# Patient Record
Sex: FEMALE | Race: BLACK OR AFRICAN AMERICAN | Employment: UNEMPLOYED | RURAL
[De-identification: names, ages, dates, MRNs, and addresses within clinical notes are randomized per-mention and may not be internally consistent; named-entity substitution may affect disease eponyms.]

---

## 2017-01-25 ENCOUNTER — OFFICE VISIT (OUTPATIENT)
Dept: INTERNAL MEDICINE CLINIC | Age: 52
End: 2017-01-25

## 2017-01-25 VITALS
BODY MASS INDEX: 39.1 KG/M2 | SYSTOLIC BLOOD PRESSURE: 133 MMHG | RESPIRATION RATE: 20 BRPM | HEART RATE: 82 BPM | OXYGEN SATURATION: 100 % | WEIGHT: 264 LBS | TEMPERATURE: 95 F | DIASTOLIC BLOOD PRESSURE: 87 MMHG | HEIGHT: 69 IN

## 2017-01-25 DIAGNOSIS — J01.00 ACUTE MAXILLARY SINUSITIS, RECURRENCE NOT SPECIFIED: Primary | ICD-10-CM

## 2017-01-25 DIAGNOSIS — K21.9 GASTROESOPHAGEAL REFLUX DISEASE WITHOUT ESOPHAGITIS: ICD-10-CM

## 2017-01-25 DIAGNOSIS — J02.9 SORE THROAT: ICD-10-CM

## 2017-01-25 DIAGNOSIS — E66.9 OBESITY (BMI 30-39.9): ICD-10-CM

## 2017-01-25 LAB
S PYO AG THROAT QL: NEGATIVE
VALID INTERNAL CONTROL?: YES

## 2017-01-25 RX ORDER — AMOXICILLIN AND CLAVULANATE POTASSIUM 500; 125 MG/1; MG/1
1 TABLET, FILM COATED ORAL 2 TIMES DAILY
Qty: 20 TAB | Refills: 0 | Status: SHIPPED | OUTPATIENT
Start: 2017-01-25 | End: 2017-02-23 | Stop reason: ALTCHOICE

## 2017-01-25 RX ORDER — ESOMEPRAZOLE MAGNESIUM 20 MG/1
1 TABLET, DELAYED RELEASE ORAL DAILY
Qty: 30 TAB | Refills: 5 | Status: SHIPPED | OUTPATIENT
Start: 2017-01-25 | End: 2017-10-03 | Stop reason: ALTCHOICE

## 2017-01-25 NOTE — PATIENT INSTRUCTIONS
Learning About Dietary Guidelines  What are the Dietary Guidelines for Americans? Dietary Guidelines for Americans provide tips for eating well and staying healthy. This helps reduce the risk for long-term (chronic) diseases. These adult guidelines from [place] recommend that you:  · Eat lots of fruits, vegetables, whole grains, and low-fat or nonfat dairy products. · Try to balance your eating with your activity. This helps you stay at a healthy weight. · Drink alcohol in moderation, if at all. · Limit foods high in salt, saturated fat, trans fat, and added sugar. What is MyPlate? MyPlate is the U.S. government's food guide. It can help you make your own well-balanced eating plan. A balanced eating plan means that you eat enough, but not too much, and that your food gives you the nutrients you need to stay healthy. MyPlate focuses on eating plenty of whole grains, fruits, and vegetables, and on limiting fat and sugar. It is available online at www. ChooseMyPlate.gov. How can you get started? MyPlate suggests that most adults eat certain amounts from the different food groups:  Grains  Eat 5 to 8 ounces of grains each day. Half of those should be whole grains. Choose whole-grain breads, cold and cooked cereals and grains, pasta (without creamy sauces), hard rolls, or low-fat or fat-free crackers. Vegetables  Eat 2 to 3 cups of vegetables every day. They contain little if any fat. And they have lots of nutrients that help protect against heart disease. Fruits  Eat 1½ to 2 cups of fruits every day. Fruits contain very little fat but lots of nutrients. Protein foods  Most adults need 5 to 6½ ounces each day. Choose fish and lean poultry more often. Eat red meat and fried meats less often. Dried beans, tofu, and nuts are also good sources of protein. Dairy  Most adults need 3 cups of milk and milk products a day. Choose low-fat or fat-free products from this food group.  If you have problems digesting milk, try eating cheese or yogurt instead. Limit fats and oils, including those used in cooking. When you do use fats, choose oils that are liquid at room temperature (unsaturated fats). These include canola oil and olive oil. Avoid foods with trans fats, such as many fried foods, cookies, and snack foods. Where can you learn more? Go to http://melly-chika.info/. Enter R238 in the search box to learn more about \"Learning About Dietary Guidelines. \"  Current as of: July 26, 2016  Content Version: 11.1  © 9573-5896 [employer]. Care instructions adapted under license by [employer] (which disclaims liability or warranty for this information). If you have questions about a medical condition or this instruction, always ask your healthcare professional. [address] any warranty or liability for your use of this information.

## 2017-01-25 NOTE — MR AVS SNAPSHOT
Visit Information Date & Time Provider Department Dept. Phone Encounter #  
 1/25/2017 10:00 AM Meghan Beltran NP [facility] Care [phone] Upcoming Health Maintenance Date Due HEMOGLOBIN A1C Q6M 1/26/2017* EYE EXAM RETINAL OR DILATED Q1 4/30/2017* FOOT EXAM Q1 12/2/2017 MICROALBUMIN Q1 12/2/2017 LIPID PANEL Q1 12/2/2017 PAP AKA CERVICAL CYTOLOGY 7/28/2018 DTaP/Tdap/Td series (2 - Td) 1/18/2026 *Topic was postponed. The date shown is not the original due date. Allergies as of 1/25/2017  Review Complete On: 1/25/2017 By: Meghan Beltran NP Severity Noted Reaction Type Reactions Cymbalta [Duloxetine]  05/10/2012    Other (comments)  
 dizziness Zithromax [Azithromycin]  11/17/2014    Other (comments) Current Immunizations  Reviewed on 1/9/2017 Name Date HEP A/HEP B Combined Vaccine 1/21/2011 Influenza Vaccine 10/30/2016, 10/15/2012 Influenza Vaccine Intradermal PF 11/18/2014 Influenza Vaccine Split 9/22/2011, 1/14/2011 PPD 10/12/2012, 1/21/2011 Pneumococcal Conjugate (PCV-13) 11/12/2015 Pneumococcal Polysaccharide (PPSV-23) 1/31/2011 Pneumococcal Vaccine (Unspecified Type) 1/14/2011 Tdap 1/18/2016 Not reviewed this visit You Were Diagnosed With   
  
 Codes Comments Acute maxillary sinusitis, recurrence not specified    -  Primary ICD-10-CM: J01.00 ICD-9-CM: 461.0 Sore throat     ICD-10-CM: J02.9 ICD-9-CM: 653 Gastroesophageal reflux disease without esophagitis     ICD-10-CM: K21.9 ICD-9-CM: 530.81 Vitals BP Pulse Temp Resp Height(growth percentile) Weight(growth percentile) 133/87 (BP 1 Location: Left arm, BP Patient Position: Sitting) 82 95 °F (35 °C) (Oral) 20 5' 9\" (1.753 m) 264 lb (119.7 kg) LMP SpO2 BMI OB Status Smoking Status 05/01/2012 100% 38.99 kg/m2 Hysterectomy Never Smoker Vitals History BMI and BSA Data  Body Mass Index Body Surface Area  
 38.99 kg/m 2 2.41 m 2 Preferred Pharmacy Pharmacy Name Phone [facility] [address] [facility], [address] [phone] Your Updated Medication List  
  
   
This list is accurate as of: 1/25/17 11:17 AM.  Always use your most recent med list.  
  
  
  
  
 albuterol 90 mcg/actuation inhaler Commonly known as:  PROVENTIL HFA, VENTOLIN HFA, PROAIR HFA Take 2 Puffs by inhalation every four (4) hours as needed for Wheezing. amLODIPine-benazepril 10-20 mg per capsule Commonly known as:  Giuseppe Ban Take 1 Cap by mouth daily. amoxicillin-clavulanate 500-125 mg per tablet Commonly known as:  AUGMENTIN Take 1 Tab by mouth two (2) times a day. carvedilol 12.5 mg tablet Commonly known as:  Perdue Servant Take 1 Tab by mouth two (2) times daily (with meals). esomeprazole magnesium 20 mg Tbec Commonly known as:  NexIUM 24HR Take 1 Tab by mouth daily. GENVOYA Tab tablet Generic drug:  elvitegravir-cobicistat-emtricitabine-tenofovir alafenamide  
  
 hydroCHLOROthiazide 25 mg tablet Commonly known as:  HYDRODIURIL Take 1 Tab by mouth daily. hydrocortisone 2.5 % topical cream  
Commonly known as:  HYTONE Apply  to affected area two (2) times a day. use thin layer Prescriptions Sent to Pharmacy Refills  
 amoxicillin-clavulanate (AUGMENTIN) 500-125 mg per tablet 0 Sig: Take 1 Tab by mouth two (2) times a day. Class: Normal  
 Pharmacy: [address] [facility], [address] [facility] Ph #: [phone] Route: Oral  
 esomeprazole magnesium (NEXIUM 24HR) 20 mg TbEC 5 Sig: Take 1 Tab by mouth daily. Class: Normal  
 Pharmacy: [id number] [facility], [address] [facility] Ph #: [phone] Route: Oral  
  
We Performed the Following AMB POC RAPID STREP A [99484 CPT(R)] Patient Instructions Learning About Dietary Guidelines What are the Dietary Guidelines for Americans? Dietary Guidelines for Americans provide tips for eating well and staying healthy. This helps reduce the risk for long-term (chronic) diseases. These adult guidelines from the [place] recommend that you: 
· Eat lots of fruits, vegetables, whole grains, and low-fat or nonfat dairy products. · Try to balance your eating with your activity. This helps you stay at a healthy weight. · Drink alcohol in moderation, if at all. · Limit foods high in salt, saturated fat, trans fat, and added sugar. What is MyPlate? MyPlate is the U.S[place] government's food guide. It can help you make your own well-balanced eating plan. A balanced eating plan means that you eat enough, but not too much, and that your food gives you the nutrients you need to stay healthy. MyPlate focuses on eating plenty of whole grains, fruits, and vegetables, and on limiting fat and sugar. It is available online at www. ChooseMyPlate.gov. How can you get started? MyPlate suggests that most adults eat certain amounts from the different food groups: 
Grains Eat 5 to 8 ounces of grains each day. Half of those should be whole grains. Choose whole-grain breads, cold and cooked cereals and grains, pasta (without creamy sauces), hard rolls, or low-fat or fat-free crackers. Vegetables Eat 2 to 3 cups of vegetables every day. They contain little if any fat. And they have lots of nutrients that help protect against heart disease. Fruits Eat 1½ to 2 cups of fruits every day. Fruits contain very little fat but lots of nutrients. Protein foods Most adults need 5 to 6½ ounces each day. Choose fish and lean poultry more often. Eat red meat and fried meats less often. Dried beans, tofu, and nuts are also good sources of protein. Dairy Most adults need 3 cups of milk and milk products a day. Choose low-fat or fat-free products from this food group.  If you have problems digesting milk, try eating cheese or yogurt instead. Limit fats and oils, including those used in cooking. When you do use fats, choose oils that are liquid at room temperature (unsaturated fats). These include canola oil and olive oil. Avoid foods with trans fats, such as many fried foods, cookies, and snack foods. Where can you learn more? Go to http://melly-chika.info/. Enter B515 in the search box to learn more about \"Learning About Dietary Guidelines. \" Current as of: July 26, 2016 Content Version: 11.1 © 9486-3034 [employer]. Care instructions adapted under license by [employer] (which disclaims liability or warranty for this information). If you have questions about a medical condition or this instruction, always ask your healthcare professional. [address] any warranty or liability for your use of this information. Introducing [facility]! Dear Chrystal Wells: 
Thank you for requesting a Cloupia account. Our records indicate that you already have an active Cloupia account. You can access your account anytime at [email or web address] Did you know that you can access your hospital and ER discharge instructions at any time in Cloupia? You can also review all of your test results from your hospital stay or ER visit. Additional Information If you have questions, please visit the Frequently Asked Questions section of the Cloupia website at [email or web address] Remember, Cloupia is NOT to be used for urgent needs. For medical emergencies, dial 911. Now available from your iPhone and Android! Please provide this summary of care documentation to your next provider. Your primary care clinician is listed as Ike Mcduffie. If you have any questions after today's visit, please call [phone].

## 2017-01-25 NOTE — PROGRESS NOTES
HISTORY OF PRESENT ILLNESS  Sumit Ryan is a 54 y.o. female. HPI  Chief Complaint   Patient presents with    Cough     productive of yellow to green sputum, blood tinged    Sinus Infection     Request refill for nexium. Review of Systems   Constitutional: Positive for chills. Negative for fever. HENT: Positive for congestion and sore throat. Negative for ear discharge and ear pain. Respiratory: Positive for cough and sputum production. Cardiovascular: Positive for chest pain and leg swelling. Feels it is stress related chest pain  Did not want it investigated  Swelling goes down over night. Neurological: Positive for headaches. Physical Exam   Constitutional: She is oriented to person, place, and time. She appears well-developed and well-nourished. HENT:   Head: Normocephalic. Positive for pain with palpation of sinuses frontal and maxillary. Positive for postnasal drip. Eyes: Conjunctivae are normal.   Cardiovascular: Normal rate, regular rhythm, normal heart sounds and intact distal pulses. Exam reveals no gallop and no friction rub. No murmur heard. Pulmonary/Chest: Effort normal and breath sounds normal. No respiratory distress. She has no wheezes. She has no rales. Musculoskeletal: Normal range of motion. Neurological: She is alert and oriented to person, place, and time. Skin: Skin is warm and dry. Psychiatric: She has a normal mood and affect. Her behavior is normal.   Nursing note and vitals reviewed. Plan of care and AVS reviewed with patient who verbalized understanding. ASSESSMENT and PLAN    ICD-10-CM ICD-9-CM    1. Acute maxillary sinusitis, recurrence not specified J01.00 461.0 amoxicillin-clavulanate (AUGMENTIN) 500-125 mg per tablet   2. Sore throat J02.9 462 AMB POC RAPID STREP A   3. Gastroesophageal reflux disease without esophagitis K21.9 530.81 esomeprazole magnesium (NEXIUM 24HR) 20 mg TbEC   Rapid strep negative.   Reviewed use of augmentin. F/u prn. Discussed the patient's BMI with her. The BMI follow up plan is as follows:     I have reviewed/discussed the above normal BMI with the patient. I have recommended the following interventions: dietary management education, guidance, and counseling . The plan is as follows: I have counseled this patient on diet and exercise regimens.

## 2017-01-31 ENCOUNTER — TELEPHONE (OUTPATIENT)
Dept: INTERNAL MEDICINE CLINIC | Age: 52
End: 2017-01-31

## 2017-01-31 NOTE — TELEPHONE ENCOUNTER
1/31/17 @ 9:16 Called  attempted PA on Nexium 20 mg Daily state pharmacy was running capsule if run as OTC it is covered under plan. [address] called @ [phone] spoke with pharmacist Raz Lr state I will try later,request given to call patient when ready for .

## 2017-02-08 ENCOUNTER — OFFICE VISIT (OUTPATIENT)
Dept: INTERNAL MEDICINE CLINIC | Age: 52
End: 2017-02-08

## 2017-02-08 VITALS
TEMPERATURE: 97 F | RESPIRATION RATE: 20 BRPM | DIASTOLIC BLOOD PRESSURE: 84 MMHG | BODY MASS INDEX: 39.99 KG/M2 | WEIGHT: 270 LBS | HEART RATE: 79 BPM | HEIGHT: 69 IN | SYSTOLIC BLOOD PRESSURE: 135 MMHG

## 2017-02-08 DIAGNOSIS — H93.12 RINGING IN EAR, LEFT: ICD-10-CM

## 2017-02-08 DIAGNOSIS — E66.9 OBESITY (BMI 30-39.9): ICD-10-CM

## 2017-02-08 DIAGNOSIS — H61.22 IMPACTED CERUMEN OF LEFT EAR: Primary | ICD-10-CM

## 2017-02-08 NOTE — PROGRESS NOTES
Used wax removal system at home since last visit - non productive  Yosef Herrera LPN  1/7/2235  42:17 AM

## 2017-02-08 NOTE — MR AVS SNAPSHOT
Visit Information Date & Time Provider Department Dept. Phone Encounter #  
 2/8/2017 10:15 AM Nakitawendi Salguero, 1 [facility] [id number] Upcoming Health Maintenance Date Due HEMOGLOBIN A1C Q6M 4/30/2017* EYE EXAM RETINAL OR DILATED Q1 4/30/2017* FOOT EXAM Q1 12/2/2017 MICROALBUMIN Q1 12/2/2017 LIPID PANEL Q1 12/2/2017 PAP AKA CERVICAL CYTOLOGY 7/28/2018 DTaP/Tdap/Td series (2 - Td) 1/18/2026 *Topic was postponed. The date shown is not the original due date. Allergies as of 2/8/2017  Review Complete On: 2/8/2017 By: Nakita Salguero NP Severity Noted Reaction Type Reactions Cymbalta [Duloxetine]  05/10/2012    Other (comments)  
 dizziness Zithromax [Azithromycin]  11/17/2014    Other (comments) Current Immunizations  Reviewed on 1/9/2017 Name Date HEP A/HEP B Combined Vaccine 1/21/2011 Influenza Vaccine 10/30/2016, 10/15/2012 Influenza Vaccine Intradermal PF 11/18/2014 Influenza Vaccine Split 9/22/2011, 1/14/2011 PPD 10/12/2012, 1/21/2011 Pneumococcal Conjugate (PCV-13) 11/12/2015 Pneumococcal Polysaccharide (PPSV-23) 1/31/2011 Pneumococcal Vaccine (Unspecified Type) 1/14/2011 Tdap 1/18/2016 Not reviewed this visit You Were Diagnosed With   
  
 Codes Comments Impacted cerumen of left ear    -  Primary ICD-10-CM: H61.22 
ICD-9-CM: 380.4 Vitals BP Pulse Temp Resp Height(growth percentile) Weight(growth percentile) 135/84 (BP 1 Location: Left arm, BP Patient Position: Sitting) 79 97 °F (36.1 °C) (Oral) 20 5' 9\" (1.753 m) 270 lb (122.5 kg) LMP BMI OB Status Smoking Status 05/01/2012 39.87 kg/m2 Hysterectomy Never Smoker BMI and BSA Data Body Mass Index Body Surface Area  
 39.87 kg/m 2 2.44 m 2 Preferred Pharmacy Pharmacy Name Phone [facility] [address] [facility], [address] [facility] [phone] Your Updated Medication List  
  
   
This list is accurate as of: 2/8/17 11:07 AM.  Always use your most recent med list.  
  
  
  
  
 albuterol 90 mcg/actuation inhaler Commonly known as:  PROVENTIL HFA, VENTOLIN HFA, PROAIR HFA Take 2 Puffs by inhalation every four (4) hours as needed for Wheezing. amLODIPine-benazepril 10-20 mg per capsule Commonly known as:  Leafy Beauregard Take 1 Cap by mouth daily. amoxicillin-clavulanate 500-125 mg per tablet Commonly known as:  AUGMENTIN Take 1 Tab by mouth two (2) times a day. carvedilol 12.5 mg tablet Commonly known as:  Ca Naty Take 1 Tab by mouth two (2) times daily (with meals). esomeprazole magnesium 20 mg Tbec Commonly known as:  NexIUM 24HR Take 1 Tab by mouth daily. GENVOYA Tab tablet Generic drug:  elvitegravir-cobicistat-emtricitabine-tenofovir alafenamide  
  
 hydroCHLOROthiazide 25 mg tablet Commonly known as:  HYDRODIURIL Take 1 Tab by mouth daily. hydrocortisone 2.5 % topical cream  
Commonly known as:  HYTONE Apply  to affected area two (2) times a day. use thin layer We Performed the Following REMOVE IMPACTED EAR WAX [33786 CPT(R)] Introducing [facility]! Dear Nallely Aragon: 
Thank you for requesting a Acarix account. Our records indicate that you already have an active Acarix account. You can access your account anytime at [email or web address] Did you know that you can access your hospital and ER discharge instructions at any time in Acarix? You can also review all of your test results from your hospital stay or ER visit. Additional Information If you have questions, please visit the Frequently Asked Questions section of the Acarix website at [email or web address] Remember, Acarix is NOT to be used for urgent needs. For medical emergencies, dial 911. Now available from your iPhone and Android!  
  
  
 Please provide this summary of care documentation to your next provider. Your primary care clinician is listed as Yudi Maher. If you have any questions after today's visit, please call [phone].

## 2017-02-16 NOTE — PROGRESS NOTES
HISTORY OF PRESENT ILLNESS  Ashley Culp is a 54 y.o. female. HPI  Chief Complaint   Patient presents with    Ear Fullness     left ear    Ringing in Ear     left ear     States used ear wax removal but ear did not get better. Requesting ear irrigation. Review of Systems   Constitutional: Negative. HENT: Positive for tinnitus. Negative for ear discharge and ear pain. C/O ear fullness   Eyes: Negative. Respiratory: Negative. Negative for cough. Cardiovascular: Negative. Negative for chest pain and leg swelling. Gastrointestinal: Negative. Genitourinary: Negative. Physical Exam   Constitutional: She is oriented to person, place, and time. She appears well-developed and well-nourished. No distress. HENT:   Head: Normocephalic and atraumatic. Positive for cerumen left ear. Eyes: Conjunctivae are normal.   Cardiovascular: Normal rate, regular rhythm, normal heart sounds and intact distal pulses. Exam reveals no gallop and no friction rub. No murmur heard. Pulmonary/Chest: Effort normal and breath sounds normal. No respiratory distress. She has no wheezes. She has no rales. Abdominal: Soft. Bowel sounds are normal. She exhibits no distension. There is no tenderness. Musculoskeletal: Normal range of motion. She exhibits no edema, tenderness or deformity. Neurological: She is alert and oriented to person, place, and time. Skin: Skin is warm and dry. She is not diaphoretic. Nursing note and vitals reviewed. Plan of care and AVS reviewed with patient who verbalized understanding. ASSESSMENT and PLAN    ICD-10-CM ICD-9-CM    1. Impacted cerumen of left ear H61.22 380.4 REMOVE IMPACTED EAR WAX   2. Ringing in ear, left H93.12 388.30    3. Obesity (BMI 30-39. 9) E66.9 278.00    Patient to monitor ringing in ear. F/U prn. Discussed the patient's BMI with her.   The BMI follow up plan is as follows:     I have reviewed/discussed the above normal BMI with the patient. I have recommended the following interventions: dietary management education, guidance, and counseling . The plan is as follows: I have counseled this patient on diet and exercise regimens.

## 2017-02-23 ENCOUNTER — OFFICE VISIT (OUTPATIENT)
Dept: INTERNAL MEDICINE CLINIC | Age: 52
End: 2017-02-23

## 2017-02-23 VITALS
DIASTOLIC BLOOD PRESSURE: 87 MMHG | OXYGEN SATURATION: 98 % | WEIGHT: 265.8 LBS | TEMPERATURE: 97.3 F | RESPIRATION RATE: 18 BRPM | SYSTOLIC BLOOD PRESSURE: 130 MMHG | HEART RATE: 88 BPM | BODY MASS INDEX: 39.37 KG/M2 | HEIGHT: 69 IN

## 2017-02-23 DIAGNOSIS — Z20.828 EXPOSURE TO THE FLU: ICD-10-CM

## 2017-02-23 DIAGNOSIS — J01.10 ACUTE FRONTAL SINUSITIS, RECURRENCE NOT SPECIFIED: Primary | ICD-10-CM

## 2017-02-23 LAB
QUICKVUE INFLUENZA TEST: NEGATIVE
VALID INTERNAL CONTROL?: YES

## 2017-02-23 RX ORDER — CIPROFLOXACIN 500 MG/1
TABLET ORAL
COMMUNITY
Start: 2016-12-09 | End: 2017-02-23 | Stop reason: ALTCHOICE

## 2017-02-23 RX ORDER — AMOXICILLIN AND CLAVULANATE POTASSIUM 500; 125 MG/1; MG/1
1 TABLET, FILM COATED ORAL 2 TIMES DAILY
Qty: 20 TAB | Refills: 0 | Status: SHIPPED | OUTPATIENT
Start: 2017-02-23 | End: 2017-05-02 | Stop reason: ALTCHOICE

## 2017-02-23 RX ORDER — AMOXICILLIN AND CLAVULANATE POTASSIUM 500; 125 MG/1; MG/1
TABLET, FILM COATED ORAL
COMMUNITY
Start: 2017-01-25 | End: 2017-02-23 | Stop reason: ALTCHOICE

## 2017-02-23 RX ORDER — OSELTAMIVIR PHOSPHATE 75 MG/1
75 CAPSULE ORAL DAILY
Qty: 10 CAP | Refills: 0 | Status: SHIPPED | OUTPATIENT
Start: 2017-02-23 | End: 2017-03-05

## 2017-02-23 NOTE — LETTER
NOTIFICATION RETURN TO WORK / SCHOOL 
 
2/23/2017 2:16 PM 
 
Ms. Renetta Diaz [address] [address] To Whom It May Concern: 
 
Renetta Diaz is currently under the care of [address]. She will return to work/school on: February 27, 2017. If there are questions or concerns please have the patient contact our office.  
 
 
 
Sincerely, 
 
 
Falguni Turner NP

## 2017-02-23 NOTE — MR AVS SNAPSHOT
Visit Information Date & Time Provider Department Dept. Phone Encounter #  
 2/23/2017  1:45 PM Epi Headley, 1 [facility] [phone] [id number] Your Appointments 10/27/2017 10:30 AM  
ROUTINE CARE with Lisa Pham MD  
[facility]) Appt Note: follow up 2 weeks am 10.06.2016; Well Woman $0CP SL 2.22.17  
 [address] [facility]. Subhash [address] [address] [address] [place] [address] [address] [address] Upcoming Health Maintenance Date Due HEMOGLOBIN A1C Q6M 4/30/2017* EYE EXAM RETINAL OR DILATED Q1 4/30/2017* FOOT EXAM Q1 12/2/2017 MICROALBUMIN Q1 12/2/2017 LIPID PANEL Q1 12/2/2017 PAP AKA CERVICAL CYTOLOGY 7/28/2018 DTaP/Tdap/Td series (2 - Td) 1/18/2026 *Topic was postponed. The date shown is not the original due date. Allergies as of 2/23/2017  Review Complete On: 2/23/2017 By: Epi Headley NP Severity Noted Reaction Type Reactions Cymbalta [Duloxetine]  05/10/2012    Other (comments)  
 dizziness Zithromax [Azithromycin]  11/17/2014    Other (comments) Current Immunizations  Reviewed on 1/9/2017 Name Date HEP A/HEP B Combined Vaccine 1/21/2011 Influenza Vaccine 10/30/2016, 10/15/2012 Influenza Vaccine Intradermal PF 11/18/2014 Influenza Vaccine Split 9/22/2011, 1/14/2011 PPD 10/12/2012, 1/21/2011 Pneumococcal Conjugate (PCV-13) 11/12/2015 Pneumococcal Polysaccharide (PPSV-23) 1/31/2011 Pneumococcal Vaccine (Unspecified Type) 1/14/2011 Tdap 1/18/2016 Not reviewed this visit You Were Diagnosed With   
  
 Codes Comments Acute frontal sinusitis, recurrence not specified    -  Primary ICD-10-CM: J01.10 ICD-9-CM: 947.3 Exposure to the flu     ICD-10-CM: Z20.828 ICD-9-CM: V01.79 Vitals BP  
  
  
  
  
  
 130/87 (BP 1 Location: Left arm, BP Patient Position: Sitting)  Vitals History BMI and BSA Data Body Mass Index Body Surface Area  
 39.25 kg/m 2 2.42 m 2 Preferred Pharmacy Pharmacy Name Phone Yancy Jiménez [facility] [phone] Your Updated Medication List  
  
   
This list is accurate as of: 2/23/17  2:12 PM.  Always use your most recent med list.  
  
  
  
  
 albuterol 90 mcg/actuation inhaler Commonly known as:  PROVENTIL HFA, VENTOLIN HFA, PROAIR HFA Take 2 Puffs by inhalation every four (4) hours as needed for Wheezing. amLODIPine-benazepril 10-20 mg per capsule Commonly known as:  Pamela Heimlich Take 1 Cap by mouth daily. amoxicillin-clavulanate 500-125 mg per tablet Commonly known as:  AUGMENTIN Take 1 Tab by mouth two (2) times a day. carvedilol 12.5 mg tablet Commonly known as:  Emeterio Lawman Take 1 Tab by mouth two (2) times daily (with meals). esomeprazole magnesium 20 mg Tbec Commonly known as:  NexIUM 24HR Take 1 Tab by mouth daily. GENVOYA Tab tablet Generic drug:  elvitegravir-cobicistat-emtricitabine-tenofovir alafenamide  
  
 hydroCHLOROthiazide 25 mg tablet Commonly known as:  HYDRODIURIL Take 1 Tab by mouth daily. hydrocortisone 2.5 % topical cream  
Commonly known as:  HYTONE Apply  to affected area two (2) times a day. use thin layer  
  
 oseltamivir 75 mg capsule Commonly known as:  TAMIFLU Take 1 Cap by mouth daily for 10 days. Prescriptions Sent to Pharmacy Refills  
 amoxicillin-clavulanate (AUGMENTIN) 500-125 mg per tablet 0 Sig: Take 1 Tab by mouth two (2) times a day. Class: Normal  
 Pharmacy: [address] Ph #: [phone] Route: Oral  
 oseltamivir (TAMIFLU) 75 mg capsule 0 Sig: Take 1 Cap by mouth daily for 10 days.   
 Class: Normal  
 Pharmacy: [address]  #: [phone] Route: Oral  
  
Introducing [facility] & HEALTH SERVICES! Dear Clare Brewer: 
Thank you for requesting a InstaMed account. Our records indicate that you already have an active InstaMed account. You can access your account anytime at [email or web address]. [email or web address] Did you know that you can access your hospital and ER discharge instructions at any time in InstaMed? You can also review all of your test results from your hospital stay or ER visit. Additional Information If you have questions, please visit the Frequently Asked Questions section of the InstaMed website at [email or web address] Remember, InstaMed is NOT to be used for urgent needs. For medical emergencies, dial 911. Now available from your iPhone and Android! Please provide this summary of care documentation to your next provider. Your primary care clinician is listed as Floyd Quiver. If you have any questions after today's visit, please call [phone].

## 2017-02-23 NOTE — PROGRESS NOTES
HISTORY OF PRESENT ILLNESS  Alesia Martini is a 54 y.o. female. HPI  Chief Complaint   Patient presents with    Nasal Congestion     Body Aches, Malaise, Fever, Been Exposed to Flu      C/O sore throat and cough and exposed to flu    Review of Systems   Constitutional: Positive for fever. Negative for chills. HENT: Positive for congestion and sore throat. Respiratory: Positive for cough. Negative for shortness of breath and wheezing. Gastrointestinal: Positive for nausea. Musculoskeletal: Positive for myalgias. Neurological: Positive for headaches. Physical Exam   Constitutional: She is oriented to person, place, and time. She appears well-developed and well-nourished. HENT:   Head: Normocephalic and atraumatic. Positive for post nasal drip  Positive for frontal sinus pain with palpation   Eyes: Conjunctivae are normal. Right eye exhibits no discharge. Left eye exhibits no discharge. No scleral icterus. Neck: Normal range of motion. Neck supple. Cardiovascular: Normal rate, regular rhythm and normal heart sounds. Exam reveals no gallop and no friction rub. No murmur heard. Pulmonary/Chest: Effort normal. No respiratory distress. She has no wheezes. She has no rales. Positive for intermittent cough   Musculoskeletal: Normal range of motion. She exhibits no edema, tenderness or deformity. Neurological: She is alert and oriented to person, place, and time. Skin: Skin is warm and dry. Nursing note and vitals reviewed. Plan of care and AVS reviewed with patient who verbalized understanding. ASSESSMENT and PLAN    ICD-10-CM ICD-9-CM    1. Acute frontal sinusitis, recurrence not specified J01.10 461.1 amoxicillin-clavulanate (AUGMENTIN) 500-125 mg per tablet   2. Exposure to the flu Z20.828 V01.79 oseltamivir (TAMIFLU) 75 mg capsule      AMB POC RAPID INFLUENZA TEST   Reviewed with patient use of augmentin and tamiflu. F/U prn.

## 2017-02-23 NOTE — PROGRESS NOTES
Chief Complaint   Patient presents with    Nasal Congestion     Body Aches, Malaise, Fever, Been Exposed to Flu      1. Have you been to the ER, urgent care clinic since your last visit? Hospitalized since your last visit? No    2. Have you seen or consulted any other health care providers outside of the [facility] since your last visit? Include any pap smears or colon screening. No     There are no preventive care reminders to display for this patient.

## 2017-03-10 ENCOUNTER — TELEPHONE (OUTPATIENT)
Dept: INTERNAL MEDICINE CLINIC | Age: 52
End: 2017-03-10

## 2017-03-10 DIAGNOSIS — J01.80 OTHER ACUTE SINUSITIS: Primary | ICD-10-CM

## 2017-03-10 RX ORDER — DOXYCYCLINE 100 MG/1
100 TABLET ORAL 2 TIMES DAILY
Qty: 20 TAB | Refills: 0 | Status: SHIPPED | OUTPATIENT
Start: 2017-03-10 | End: 2017-03-20

## 2017-03-10 NOTE — TELEPHONE ENCOUNTER
Chief Complaint   Patient presents with    Request For New Medication     SINUS INFECTION     Patient is requesting a different ABT treatment. She believes that her sinus infection may have returned. She has complaints of nasal drainage, nose clogging, and facial tightness or in her words, \"as if someone had hit me in the face with a hammer\". She requests that the medication be sent to the [facility].

## 2017-05-02 ENCOUNTER — OFFICE VISIT (OUTPATIENT)
Dept: INTERNAL MEDICINE CLINIC | Age: 52
End: 2017-05-02

## 2017-05-02 VITALS
TEMPERATURE: 97 F | BODY MASS INDEX: 38.57 KG/M2 | OXYGEN SATURATION: 100 % | HEIGHT: 69 IN | RESPIRATION RATE: 18 BRPM | DIASTOLIC BLOOD PRESSURE: 77 MMHG | WEIGHT: 260.4 LBS | HEART RATE: 75 BPM | SYSTOLIC BLOOD PRESSURE: 130 MMHG

## 2017-05-02 DIAGNOSIS — E11.9 DIABETES MELLITUS WITHOUT COMPLICATION (HCC): ICD-10-CM

## 2017-05-02 DIAGNOSIS — R10.84 GENERALIZED ABDOMINAL PAIN: Primary | ICD-10-CM

## 2017-05-02 RX ORDER — HYDROXYZINE PAMOATE 25 MG/1
CAPSULE ORAL
COMMUNITY
Start: 2017-03-18 | End: 2017-05-02 | Stop reason: ALTCHOICE

## 2017-05-02 RX ORDER — BUPROPION HYDROCHLORIDE 150 MG/1
TABLET ORAL
COMMUNITY
Start: 2017-03-18 | End: 2018-03-16 | Stop reason: ALTCHOICE

## 2017-05-02 NOTE — PROGRESS NOTES
Chief Complaint   Patient presents with    Abdominal Pain     TWICE A WEEK X 2 MONTHS; LACK OF APPETITE; DIARRHEA,     Toe Pain     RIGHT BIG TOE     1. Have you been to the ER, urgent care clinic since your last visit? Hospitalized since your last visit? No    2. Have you seen or consulted any other health care providers outside of the [address] [place] [address] since your last visit? Include any pap smears or colon screening. No     Health Maintenance Due   Topic Date Due    HEMOGLOBIN A1C Q6M  05/17/2015    EYE EXAM RETINAL OR DILATED Q1  10/17/2015     Eye exam 4 or 5 months ago [facility]). Do you have an [address] in place in the event that you have a healthcare crisis that could impact your decision making as it pertains to your health? NO    Would you like information about Advance Care Planning? NO    Information given.  NO

## 2017-05-02 NOTE — PROGRESS NOTES
HISTORY OF PRESENT ILLNESS  Delroy Painter is a 54 y.o. female. HPI  Chief Complaint   Patient presents with    Abdominal Pain     TWICE A WEEK X 2 MONTHS; LACK OF APPETITE; DIARRHEA,     Toe Pain     RIGHT BIG TOE   States having ABD pain with diarrhea x 2 months  State nexium does not help with pain. States this happens with any meal.    States pain . in in R big toe  Request paperwork showing infection in R great toe  Will F//U with Dr. Jazzy Ochoa this Friday. Review of Systems   Gastrointestinal: Positive for abdominal pain and diarrhea. Negative for blood in stool, nausea and vomiting. Skin:        Break out in sweat with stomach pain. Did not check BP with this problelm. Physical Exam   Constitutional: She is oriented to person, place, and time. She appears well-developed and well-nourished. No distress. HENT:   Head: Normocephalic and atraumatic. Eyes: Conjunctivae are normal.   Cardiovascular: Normal rate, regular rhythm, normal heart sounds and intact distal pulses. Exam reveals no gallop and no friction rub. No murmur heard. Pulmonary/Chest: Effort normal and breath sounds normal. No respiratory distress. She has no wheezes. She has no rales. Abdominal: Soft. Bowel sounds are normal. There is tenderness. Positive for generalized ABD Pain with palpation. Musculoskeletal: Normal range of motion. Neurological: She is alert and oriented to person, place, and time. Skin: Skin is warm and dry. She is not diaphoretic. Nursing note and vitals reviewed. Plan of care and AVS reviewed with patient who verbalized understanding. ASSESSMENT and PLAN    ICD-10-CM ICD-9-CM    1. Generalized abdominal pain R10.84 789.07 US ABD COMP      LIPASE      AMYLASE      CANCELED: LIPASE      CANCELED: AMYLASE   2.  Diabetes mellitus without complication ([facility] [address] X23.7 250.00 HEMOGLOBIN A1C WITH EAG      CANCELED: HEMOGLOBIN A1C WITH EAG   Resent order for eye exam from [facility]  Nursing to assist with [address]  Will notify patient of lab results. F/U 6 months DM.

## 2017-05-02 NOTE — PATIENT INSTRUCTIONS
Learning About Tests When You Have Diabetes  Why do you need regular diabetes tests? Diabetes can be hard on your body if it's not well controlled. But having tests on a regular schedule can help you and your doctor find problems early, when it's easier to start managing them. What tests do you need? The tests you may have, how often you should have them, and the goals of the tests are:  A1c blood test. This test shows the average level of blood sugar over the past 2 to 3 months. It helps your doctor see whether blood sugar levels have been staying within your target range. · How often: Every 3 to 6 months  · Goal: A blood sugar level in your target range  Blood pressure test: This test measures the pressure of blood flow in the arteries. Controlling blood pressure can help prevent damage to nerves and blood vessels. · How often: Every 3 to 6 months  · Goal: A blood pressure level in your target range  Cholesterol test: This test measures the amount of a type of fat in the blood. It is common for people with diabetes to also have high cholesterol. Too much cholesterol in the blood can build up inside the blood vessels and raise the risk for heart attack and stroke. · How often: At the time of your diabetes diagnosis, and as often as your doctor recommends after that  · Goal: A cholesterol level in your target range  Albumin-creatinine ratio test: This test checks for kidney damage by looking for the protein albumin (say \"al-BYOO-carlos a\") in the urine. Albumin is normally found in the blood. Kidney damage can let small amounts of it (microalbumin) leak into the urine. · How often: Once a year  · Goal: No protein in the urine  Blood creatinine test/estimated glomerular filtration (eGFR): The blood creatinine (say \"xklx-ZS-mi-neen\") level shows how well your kidneys are working. Creatinine is a waste product that muscles release into the blood.  Blood creatinine is used to estimate the glomerular filtration rate. A high level of creatinine and/or a low eGFR may mean your kidneys are not working as well as they should. · How often: Once a year  · Goal: Normal level of creatinine in the blood. The eGFR goal is greater than 60 mL/min/1.73 m². Complete foot exam: The doctor checks for foot sores and whether any sensation has been lost.  · How often: Once a year  · Goal: Healthy feet with no foot ulcers or loss of feeling  Dental exam and cleaning: The dentist checks for gum disease and tooth decay. People with high blood sugar are more likely to have these problems. · How often: Every 6 months  · Goal: Healthy teeth and gums  Complete eye exam: High blood sugar levels can damage the eyes. This exam is done by an ophthalmologist or optometrist. It includes a dilated eye exam. The exam shows whether there's damage to the back of the eye (diabetic retinopathy). · How often: Once a year. If you don't have any signs of diabetic retinopathy, your doctor may recommend an exam every 2 years. · Goal: No damage to the back of the eye  Thyroid-stimulating hormone (TSH) blood test: This test checks for thyroid disease. Too little thyroid hormone can cause some medicines (like insulin) to stay in the body longer. This can cause low blood sugar. You may be tested if you have high cholesterol or are a woman over 48years old. · How often: As part of your diabetes diagnosis, and as often as your doctor recommends after that  · Goal: Normal level of TSH in the blood  Follow-up care is a key part of your treatment and safety. Be sure to make and go to all appointments, and call your doctor if you are having problems. It's also a good idea to know your test results and keep a list of the medicines you take. Where can you learn more? Go to http://melly-chika.info/. Enter [id number] in the search box to learn more about \"Learning About Tests When You Have Diabetes. \"  Current as of: May 23, 2016  Content Version: 11.2  © 8675-2712 Healthwise, Incorporated. Care instructions adapted under license by [employer] (which disclaims liability or warranty for this information). If you have questions about a medical condition or this instruction, always ask your healthcare professional. [place] [address] any warranty or liability for your use of this information.

## 2017-05-02 NOTE — MR AVS SNAPSHOT
Visit Information Date & Time Provider Department Dept. Phone Encounter #  
 5/2/2017  2:00 PM Joan Rizvi NP [facility] [id number] [phone] [id number] Follow-up Instructions Return in about 6 months (around 11/2/2017) for DM. Your Appointments 10/27/2017 10:30 AM  
ROUTINE CARE with iWsam Parrish MD  
[facility] [address]) Appt Note: follow up 2 weeks am 10.06.2016; Well Woman $0CP  2.22.17  
 [address] [facility]. Subhash [address] [address] [address] [place] [address] [address] [address] Upcoming Health Maintenance Date Due HEMOGLOBIN A1C Q6M 5/17/2015 EYE EXAM RETINAL OR DILATED Q1 10/17/2015 INFLUENZA AGE 9 TO ADULT 8/1/2017 FOOT EXAM Q1 12/2/2017 MICROALBUMIN Q1 12/2/2017 LIPID PANEL Q1 12/2/2017 PAP AKA CERVICAL CYTOLOGY 7/28/2018 DTaP/Tdap/Td series (2 - Td) 1/18/2026 Allergies as of 5/2/2017  Review Complete On: 5/2/2017 By: Joan Rizvi NP Severity Noted Reaction Type Reactions Cymbalta [Duloxetine]  05/10/2012    Other (comments)  
 dizziness Zithromax [Azithromycin]  11/17/2014    Other (comments) Current Immunizations  Reviewed on 2/28/2017 Name Date HEP A/HEP B Combined Vaccine 1/21/2011 Influenza Vaccine 10/30/2016, 10/15/2012 Influenza Vaccine Intradermal PF 11/18/2014 Influenza Vaccine Split 9/22/2011, 1/14/2011 PPD 10/12/2012, 1/21/2011 Pneumococcal Conjugate (PCV-13) 11/12/2015 Pneumococcal Polysaccharide (PPSV-23) 1/31/2011 Pneumococcal Vaccine (Unspecified Type) 1/14/2011 Tdap 1/18/2016 Not reviewed this visit You Were Diagnosed With   
  
 Codes Comments Generalized abdominal pain    -  Primary ICD-10-CM: R10.84 ICD-9-CM: 789.07 [facility]     MPA-73-NV: E11.9 ICD-9-CM: 250.00 Vitals  BP Pulse Temp Resp Height(growth percentile) Weight(growth percentile) 130/77 (BP 1 Location: Right arm, BP Patient Position: Sitting) 75 97 °F (36.1 °C) (Oral) 18 5' 9\" (1.753 m) 260 lb 6.4 oz (118.1 kg) LMP SpO2 BMI OB Status Smoking Status 05/01/2012 100% 38.45 kg/m2 Hysterectomy Never Smoker Vitals History BMI and BSA Data Body Mass Index Body Surface Area  
 38.45 kg/m 2 2.4 m 2 Preferred Pharmacy Pharmacy Name Phone [facility] [address] [facility], [address] [facility] [phone] Your Updated Medication List  
  
   
This list is accurate as of: 5/2/17  2:56 PM.  Always use your most recent med list. amLODIPine-benazepril 10-20 mg per capsule Commonly known as:  Cherylin Lancaster Take 1 Cap by mouth daily. buPROPion  mg tablet Commonly known as:  WELLBUTRIN XL  
  
 esomeprazole magnesium 20 mg Tbec Commonly known as:  NexIUM 24HR Take 1 Tab by mouth daily. GENVOYA Tab tablet Generic drug:  elvitegravir-cobicistat-emtricitabine-tenofovir alafenamide  
  
 hydroCHLOROthiazide 25 mg tablet Commonly known as:  HYDRODIURIL Take 1 Tab by mouth daily. hydrocortisone 2.5 % topical cream  
Commonly known as:  HYTONE Apply  to affected area two (2) times a day. use thin layer We Performed the Following AMYLASE [id number] CPT(R)] HEMOGLOBIN A1C WITH EAG [55150 CPT(R)] LIPASE [id number] CPT(R)] Follow-up Instructions Return in about 6 months (around 11/2/2017) for DM. To-Do List   
 05/02/2017 Imaging:  US ABD COMP Patient Instructions Learning About Tests When You Have Diabetes Why do you need regular diabetes tests? Diabetes can be hard on your body if it's not well controlled. But having tests on a regular schedule can help you and your doctor find problems early, when it's easier to start managing them. What tests do you need?  
The tests you may have, how often you should have them, and the goals of the tests are: 
A1c blood test. This test shows the average level of blood sugar over the past 2 to 3 months. It helps your doctor see whether blood sugar levels have been staying within your target range. · How often: Every 3 to 6 months · Goal: A blood sugar level in your target range Blood pressure test: This test measures the pressure of blood flow in the arteries. Controlling blood pressure can help prevent damage to nerves and blood vessels. · How often: Every 3 to 6 months · Goal: A blood pressure level in your target range Cholesterol test: This test measures the amount of a type of fat in the blood. It is common for people with diabetes to also have high cholesterol. Too much cholesterol in the blood can build up inside the blood vessels and raise the risk for heart attack and stroke. · How often: At the time of your diabetes diagnosis, and as often as your doctor recommends after that · Goal: A cholesterol level in your target range Albumin-creatinine ratio test: This test checks for kidney damage by looking for the protein albumin (say \"al-BYOO-carlos a\") in the urine. Albumin is normally found in the blood. Kidney damage can let small amounts of it (microalbumin) leak into the urine. · How often: Once a year · Goal: No protein in the urine Blood creatinine test/estimated glomerular filtration (eGFR): The blood creatinine (say \"lwej-FZ-fu-neen\") level shows how well your kidneys are working. Creatinine is a waste product that muscles release into the blood. Blood creatinine is used to estimate the glomerular filtration rate. A high level of creatinine and/or a low eGFR may mean your kidneys are not working as well as they should. · How often: Once a year · Goal: Normal level of creatinine in the blood. The eGFR goal is greater than 60 mL/min/1.73 m². Complete foot exam: The doctor checks for foot sores and whether any sensation has been lost. 
· How often: Once a year · Goal: Healthy feet with no foot ulcers or loss of feeling Dental exam and cleaning: The dentist checks for gum disease and tooth decay. People with high blood sugar are more likely to have these problems. · How often: Every 6 months · Goal: Healthy teeth and gums Complete eye exam: High blood sugar levels can damage the eyes. This exam is done by an ophthalmologist or optometrist. It includes a dilated eye exam. The exam shows whether there's damage to the back of the eye (diabetic retinopathy). · How often: Once a year. If you don't have any signs of diabetic retinopathy, your doctor may recommend an exam every 2 years. · Goal: No damage to the back of the eye Thyroid-stimulating hormone (TSH) blood test: This test checks for thyroid disease. Too little thyroid hormone can cause some medicines (like insulin) to stay in the body longer. This can cause low blood sugar. You may be tested if you have high cholesterol or are a woman over 48years old. · How often: As part of your diabetes diagnosis, and as often as your doctor recommends after that · Goal: Normal level of TSH in the blood Follow-up care is a key part of your treatment and safety. Be sure to make and go to all appointments, and call your doctor if you are having problems. It's also a good idea to know your test results and keep a list of the medicines you take. Where can you learn more? Go to http://melly-chika.info/. Enter [id number] in the search box to learn more about \"Learning About Tests When You Have Diabetes. \" Current as of: May 23, 2016 Content Version: 11.2 © 3883-8703 Healthwise, Incorporated. Care instructions adapted under license by [employer] (which disclaims liability or warranty for this information). If you have questions about a medical condition or this instruction, always ask your healthcare professional. [address] any warranty or liability for your use of this information. Introducing [facility]! Dear Maral Cespedes: 
Thank you for requesting a Razient account. Our records indicate that you already have an active Razient account. You can access your account anytime at [email or web address] Did you know that you can access your hospital and ER discharge instructions at any time in Razient? You can also review all of your test results from your hospital stay or ER visit. Additional Information If you have questions, please visit the Frequently Asked Questions section of the Razient website at [email or web address] Remember, Razient is NOT to be used for urgent needs. For medical emergencies, dial 911. Now available from your iPhone and Android! Please provide this summary of care documentation to your next provider. Your primary care clinician is listed as Kierra Ponce. If you have any questions after today's visit, please call [phone].

## 2017-05-03 ENCOUNTER — TELEPHONE (OUTPATIENT)
Dept: INTERNAL MEDICINE CLINIC | Age: 52
End: 2017-05-03

## 2017-05-03 LAB
AMYLASE SERPL-CCNC: 98 U/L (ref 31–124)
EST. AVERAGE GLUCOSE BLD GHB EST-MCNC: 131 MG/DL
HBA1C MFR BLD: 6.2 % (ref 4.8–5.6)
LIPASE SERPL-CCNC: 35 U/L (ref 0–59)

## 2017-05-03 NOTE — TELEPHONE ENCOUNTER
Chief Complaint   Patient presents with   [address] ABD COMP     Appointment scheduled for Monday, May 8, 2017 at 9 am at the [address] [place] [address] location. Patient has been informed of her appointment and requires no mailing of the above information.

## 2017-05-04 ENCOUNTER — TELEPHONE (OUTPATIENT)
Dept: SURGERY | Age: 52
End: 2017-05-04

## 2017-05-04 NOTE — TELEPHONE ENCOUNTER
Received call from patient with complaints of severe hot flashes and decreased sex drive. Patient is requesting a return call to discuss these issues.   [phone] (home)

## 2017-05-05 ENCOUNTER — TELEPHONE (OUTPATIENT)
Dept: SURGERY | Age: 52
End: 2017-05-05

## 2017-05-05 NOTE — TELEPHONE ENCOUNTER
States she was unsure of what prescription you were going to send in for her but uses the [address] in [facility]. [phone] (home) -Ms. Jenny Basurto

## 2017-05-05 NOTE — TELEPHONE ENCOUNTER
Called pt and sent rx for Premarin 0.625 mg to her pharmacy for her hot flashes and decreased libido.

## 2017-05-08 ENCOUNTER — TELEPHONE (OUTPATIENT)
Dept: SURGERY | Age: 52
End: 2017-05-08

## 2017-05-08 NOTE — TELEPHONE ENCOUNTER
Received v/m from patient requesting a alternative medication for hot flashes and decreased Libido due to the cost of medication.   [phone] (home)

## 2017-05-09 NOTE — TELEPHONE ENCOUNTER
Pt called stated that she is still unable to eat with out being sick and that she is still waitng on her ultra sound results pt would like a call back. the patient stated that she has been scared to eat stated that she ate eggs last    night and then 10 mins later she was sick stated that she has'nt ate at all today

## 2017-05-10 ENCOUNTER — OFFICE VISIT (OUTPATIENT)
Dept: SURGERY | Age: 52
End: 2017-05-10

## 2017-05-10 VITALS
RESPIRATION RATE: 16 BRPM | WEIGHT: 258 LBS | HEIGHT: 69 IN | DIASTOLIC BLOOD PRESSURE: 87 MMHG | BODY MASS INDEX: 38.21 KG/M2 | OXYGEN SATURATION: 97 % | HEART RATE: 77 BPM | TEMPERATURE: 97.8 F | SYSTOLIC BLOOD PRESSURE: 140 MMHG

## 2017-05-10 DIAGNOSIS — F52.9 FEMALE SEXUAL DYSFUNCTION: ICD-10-CM

## 2017-05-10 DIAGNOSIS — Z90.710 HISTORY OF HYSTERECTOMY INCLUDING CERVIX: ICD-10-CM

## 2017-05-10 DIAGNOSIS — N95.1 HOT FLASHES DUE TO MENOPAUSE: ICD-10-CM

## 2017-05-10 DIAGNOSIS — R68.82 DECREASED LIBIDO: Primary | ICD-10-CM

## 2017-05-10 DIAGNOSIS — R11.2 INTRACTABLE VOMITING WITH NAUSEA, UNSPECIFIED VOMITING TYPE: Primary | ICD-10-CM

## 2017-05-10 DIAGNOSIS — N95.9 MENOPAUSAL AND POSTMENOPAUSAL DISORDER: ICD-10-CM

## 2017-05-10 DIAGNOSIS — R10.11 RIGHT UPPER QUADRANT ABDOMINAL PAIN: ICD-10-CM

## 2017-05-10 RX ORDER — ESTRADIOL 2 MG/1
2 TABLET ORAL DAILY
Qty: 30 TAB | Refills: 12 | Status: SHIPPED | OUTPATIENT
Start: 2017-05-10 | End: 2018-04-13 | Stop reason: SDUPTHER

## 2017-05-10 NOTE — LETTER
NOTIFICATION RETURN TO WORK / SCHOOL 
 
5/10/2017 10:54 AM 
 
Ms. Neto Dumont [address] [facility] [address] To Whom It May Concern: 
 
Neto Dumont is currently under the care of Ayesha Cuello. She was provided transportation to her appointment by Bryan Luciano. Please excuse his absence at [employer].   
 
Sincerely, 
 
 
Billie Ballesteros MD

## 2017-05-10 NOTE — MR AVS SNAPSHOT
Visit Information Date & Time Provider Department Dept. Phone Encounter #  
 5/10/2017 10:00 AM Carlotta Cooper, [address] [facility] [address] [id number] Follow-up Instructions Return in about 3 weeks (around 5/31/2017), or if symptoms worsen or fail to improve. Your Appointments 10/27/2017 10:30 AM  
ROUTINE CARE with Carlotta Cooper MD  
[facility] [address] [facility]) Appt Note: follow up 2 weeks am 10.06.2016; Well Woman $0CP  2.22.17  
 [address] [facility]. [address] [address] [address] [place] [address] [address] Upcoming Health Maintenance Date Due  
 EYE EXAM RETINAL OR DILATED Q1 5/8/2018* INFLUENZA AGE 9 TO ADULT 8/1/2017 HEMOGLOBIN A1C Q6M 11/2/2017 FOOT EXAM Q1 12/2/2017 MICROALBUMIN Q1 12/2/2017 LIPID PANEL Q1 12/2/2017 PAP AKA CERVICAL CYTOLOGY 7/28/2018 DTaP/Tdap/Td series (2 - Td) 1/18/2026 *Topic was postponed. The date shown is not the original due date. Allergies as of 5/10/2017  Review Complete On: 5/10/2017 By: Demetris Steiner LPN Severity Noted Reaction Type Reactions Cymbalta [Duloxetine]  05/10/2012    Other (comments)  
 dizziness Zithromax [Azithromycin]  11/17/2014    Other (comments) Current Immunizations  Reviewed on 2/28/2017 Name Date HEP A/HEP B Combined Vaccine 1/21/2011 Influenza Vaccine 10/30/2016, 10/15/2012 Influenza Vaccine Intradermal PF 11/18/2014 Influenza Vaccine Split 9/22/2011, 1/14/2011 PPD 10/12/2012, 1/21/2011 Pneumococcal Conjugate (PCV-13) 11/12/2015 Pneumococcal Polysaccharide (PPSV-23) 1/31/2011 Pneumococcal Vaccine (Unspecified Type) 1/14/2011 Tdap 1/18/2016 Not reviewed this visit You Were Diagnosed With   
  
 Codes Comments Decreased libido    -  Primary ICD-10-CM: M50.13 
ICD-9-CM: 799.81 Female sexual dysfunction     ICD-10-CM: F52.9 ICD-9-CM: 302.70 History of hysterectomy including cervix     ICD-10-CM: Z90.710 ICD-9-CM: V88.01 Hot flashes due to menopause     ICD-10-CM: N95.1 ICD-9-CM: 627.2 Menopausal and postmenopausal disorder     ICD-10-CM: N95.9 ICD-9-CM: 627.9 Vitals BP Pulse Temp Resp Height(growth percentile) Weight(growth percentile) 140/87 77 97.8 °F (36.6 °C) (Oral) 16 5' 9\" (1.753 m) 258 lb (117 kg) LMP SpO2 BMI OB Status Smoking Status 05/01/2012 97% 38.1 kg/m2 Hysterectomy Never Smoker Vitals History BMI and BSA Data Body Mass Index Body Surface Area  
 38.1 kg/m 2 2.39 m 2 Preferred Pharmacy Pharmacy Name Phone [facility] [address] [place] [address] & [facility] [phone] Your Updated Medication List  
  
   
This list is accurate as of: 5/10/17 10:50 AM.  Always use your most recent med list. amLODIPine-benazepril 10-20 mg per capsule Commonly known as:  Oletta Bills Take 1 Cap by mouth daily. buPROPion  mg tablet Commonly known as:  WELLBUTRIN XL  
  
 esomeprazole magnesium 20 mg Tbec Commonly known as:  NexIUM 24HR Take 1 Tab by mouth daily. estradiol 2 mg tablet Commonly known as:  ESTRACE Take 1 Tab by mouth daily. Indications: VASOMOTOR SYMPTOMS ASSOCIATED WITH MENOPAUSE  
  
 GENVOYA Tab tablet Generic drug:  elvitegravir-cobicistat-emtricitabine-tenofovir alafenamide  
  
 hydroCHLOROthiazide 25 mg tablet Commonly known as:  HYDRODIURIL Take 1 Tab by mouth daily. hydrocortisone 2.5 % topical cream  
Commonly known as:  HYTONE Apply  to affected area two (2) times a day. use thin layer TESTOSTERONE CREAM  
Testosterone 1% in vanishing cream or ointment. Apply 1 ml externally twice per day. Prescriptions Printed Refills TESTOSTERONE CREAM 6 Sig: Testosterone 1% in vanishing cream or ointment. Apply 1 ml externally twice per day. Class: Print Prescriptions Sent to Pharmacy Refills  
 estradiol (ESTRACE) 2 mg tablet 12 Sig: Take 1 Tab by mouth daily. Indications: VASOMOTOR SYMPTOMS ASSOCIATED WITH MENOPAUSE Class: Normal  
 Pharmacy: [address] [facility], [address]  #: [phone] Route: Oral  
  
Follow-up Instructions Return in about 3 weeks (around 5/31/2017), or if symptoms worsen or fail to improve. Introducing [facility] & HEALTH SERVICES! Dear Maria Dolores Dawn: 
Thank you for requesting a [employer] account. Our records indicate that you already have an active [email or web address] account. You can access your account anytime at [email or web address] Did you know that you can access your hospital and ER discharge instructions at any time in [employer]? You can also review all of your test results from your hospital stay or ER visit. Additional Information If you have questions, please visit the Frequently Asked Questions section of the [employer] website at [email or web address] Remember, [employer] is NOT to be used for urgent needs. For medical emergencies, dial 911. Now available from your iPhone and Android! Please provide this summary of care documentation to your next provider. Your primary care clinician is listed as Jose Dunlap. If you have any questions after today's visit, please call [phone].

## 2017-05-10 NOTE — PROGRESS NOTES
SUBJECTIVE: Mary Shi is a 54 y.o. female who presents with decreased sex drive and intense hot flashes. Symptom onset has been chronic for a time period of 6 month(s). Severity is described as severe. Patient's last menstrual period was 05/01/2012. Allergies   Allergen Reactions    Cymbalta [Duloxetine] Other (comments)     dizziness    Zithromax [Azithromycin] Other (comments)       ROS: Constitutional: No weight change, chills or fever, anorexia, weakness or sleep disturbance . Cardiovascular: No chest pain, shortness of breath, or palpitations . Respiratory: No cough, shortness of breath, hemoptysis, or orthopnea . Neurologic: No syncope, headaches or seizures . Hematologic: No easy bruising or unusual bleeding . Psychiatric: No insomnia, confusion, depression, or anxiety . GI:No nausea and vomiting, diarrhea or constipation  . : See HPI . Musculoskeletal: No joint pain or muscle pain . Endocrine: No polydipsia, polyuria, cold intolerance, excessive fatigue, or sleep disturbance . Integumentary: No breast pain, lumps, nipple discharge, or axillary lumps . OBJECTIVE:     Visit Vitals    LMP 05/01/2012       General:  alert, cooperative, no distress, appears stated age   Abdomen: soft, non-tender. Bowel sounds normal. No masses,  no organomegaly   Back:  Costovertebral angle tenderness absent   Genitourinary: Pelvic exam: examination not indicated. Extremities:  extremities normal, atraumatic, no cyanosis or edema     ASSESSMENT:      ICD-10-CM ICD-9-CM    1. Decreased libido R68.82 799.81 TESTOSTERONE CREAM   2. Female sexual dysfunction F52.9 302.70    3. History of hysterectomy including cervix Z90.710 V88.01    4. Hot flashes due to menopause N95.1 627.2 estradiol (ESTRACE) 2 mg tablet   5.  Menopausal and postmenopausal disorder N95.9 627.9        Patient Active Problem List   Diagnosis Code    HTN (hypertension) I10    Other and unspecified hyperlipidemia E78.5    Right carpal tunnel syndrome G56.01    Muscle spasm M62.838    HIV infection ([facility]) Z21    Headache R51    PTSD (post-traumatic stress disorder) F43.10    Upper extremity weakness R29.898    Lower extremity weakness R29.898    Rheumatoid arthritis ([facility]) M06.9    GERD (gastroesophageal reflux disease) K21.9    ADD (attention deficit disorder) F98.8    Morbid obesity ([facility]) E66.01    Neuropathy G62.9    History of hysterectomy including cervix Z90.710    Trichomonal vaginitis A59.01       Current Outpatient Prescriptions   Medication Sig Dispense Refill    conjugated estrogens (PREMARIN) 0.625 mg tablet Take 1 Tab by mouth daily. Indications: VASOMOTOR SYMPTOMS ASSOCIATED WITH MENOPAUSE 30 Tab 11    buPROPion XL (WELLBUTRIN XL) 150 mg tablet       esomeprazole magnesium (NEXIUM 24HR) 20 mg TbEC Take 1 Tab by mouth daily. 30 Tab 5    GENVOYA tab tablet       amLODIPine-benazepril (LOTREL) 10-20 mg per capsule Take 1 Cap by mouth daily. 90 Cap 1    hydrochlorothiazide (HYDRODIURIL) 25 mg tablet Take 1 Tab by mouth daily. 30 Tab 5    hydrocortisone (HYTONE) 2.5 % topical cream Apply  to affected area two (2) times a day. use thin layer 30 g 0        Follow-up Disposition:  Return in about 3 weeks (around 5/31/2017), or if symptoms worsen or fail to improve.

## 2017-05-11 ENCOUNTER — TELEPHONE (OUTPATIENT)
Dept: INTERNAL MEDICINE CLINIC | Age: 52
End: 2017-05-11

## 2017-05-11 NOTE — TELEPHONE ENCOUNTER
Chief Complaint   Patient presents with    Appointment     FERDINAND HOLLOWAY     Appointment scheduled for Monday, May 15, 2017 at [facility]). Left message for the patient to return my call, so that I may discuss the details of her upcoming appointment. Order faxed to [phone] and was received.

## 2017-05-12 ENCOUNTER — DOCUMENTATION ONLY (OUTPATIENT)
Dept: INTERNAL MEDICINE CLINIC | Age: 52
End: 2017-05-12

## 2017-05-12 NOTE — PROGRESS NOTES
Pt called in ref to not being able to make her appt that was ish with [facility]. .stated that she was unable to make it due to work. connection was loss. If  Pt calls back please [phone] and reschedule her appt around her availability. Yan Villafana attempted to call pt back lm on vm to return call

## 2017-05-22 ENCOUNTER — TELEPHONE (OUTPATIENT)
Dept: INTERNAL MEDICINE CLINIC | Age: 52
End: 2017-05-22

## 2017-05-22 NOTE — TELEPHONE ENCOUNTER
Patient would like to speak with Dr. Riya Buck in reference to still having the same things going on. Please call her at [phone].

## 2017-05-24 DIAGNOSIS — R10.84 GENERALIZED ABDOMINAL PAIN: Primary | ICD-10-CM

## 2017-05-24 DIAGNOSIS — R11.2 NON-INTRACTABLE VOMITING WITH NAUSEA, UNSPECIFIED VOMITING TYPE: ICD-10-CM

## 2017-05-24 DIAGNOSIS — R19.7 DIARRHEA, UNSPECIFIED TYPE: ICD-10-CM

## 2017-06-14 DIAGNOSIS — I10 ESSENTIAL HYPERTENSION WITH GOAL BLOOD PRESSURE LESS THAN 130/85: ICD-10-CM

## 2017-06-14 NOTE — TELEPHONE ENCOUNTER
Requested Prescriptions     Pending Prescriptions Disp Refills    amLODIPine-benazepril (LOTREL) 10-20 mg per capsule 90 Cap 1     Sig: Take 1 Cap by mouth daily.

## 2017-06-14 NOTE — TELEPHONE ENCOUNTER
Last office visit:  5/2/17  Last filled:  7/26/16 #90 x 1 refill  No changes  Follow up in 4 months Tk Wang NP

## 2017-06-15 RX ORDER — AMLODIPINE AND BENAZEPRIL HYDROCHLORIDE 10; 20 MG/1; MG/1
1 CAPSULE ORAL DAILY
Qty: 90 CAP | Refills: 1 | Status: SHIPPED | OUTPATIENT
Start: 2017-06-15 | End: 2018-03-22 | Stop reason: SDUPTHER

## 2017-07-20 LAB
CREATININE, EXTERNAL: NORMAL
HBA1C MFR BLD HPLC: NORMAL %
LDL-C, EXTERNAL: NORMAL

## 2017-07-27 ENCOUNTER — DOCUMENTATION ONLY (OUTPATIENT)
Dept: INTERNAL MEDICINE CLINIC | Age: 52
End: 2017-07-27

## 2017-07-27 ENCOUNTER — OFFICE VISIT (OUTPATIENT)
Dept: INTERNAL MEDICINE CLINIC | Age: 52
End: 2017-07-27

## 2017-07-27 VITALS
HEIGHT: 69 IN | RESPIRATION RATE: 18 BRPM | DIASTOLIC BLOOD PRESSURE: 89 MMHG | HEART RATE: 80 BPM | OXYGEN SATURATION: 100 % | WEIGHT: 260.2 LBS | SYSTOLIC BLOOD PRESSURE: 136 MMHG | BODY MASS INDEX: 38.54 KG/M2 | TEMPERATURE: 96.9 F

## 2017-07-27 DIAGNOSIS — M19.90 ARTHRITIS: Primary | ICD-10-CM

## 2017-07-27 RX ORDER — FLUCONAZOLE 150 MG/1
TABLET ORAL
COMMUNITY
Start: 2017-07-19 | End: 2017-07-27 | Stop reason: ALTCHOICE

## 2017-07-27 RX ORDER — POLYETHYLENE GLYCOL 3350 17 G/17G
POWDER, FOR SOLUTION ORAL
COMMUNITY
Start: 2017-06-08

## 2017-07-27 RX ORDER — BUPROPION HYDROCHLORIDE 300 MG/1
TABLET ORAL
COMMUNITY
Start: 2017-05-18 | End: 2018-03-16 | Stop reason: SDUPTHER

## 2017-07-27 RX ORDER — HYDROXYZINE PAMOATE 25 MG/1
CAPSULE ORAL
COMMUNITY
Start: 2017-05-18 | End: 2018-03-16 | Stop reason: ALTCHOICE
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
## 2017-07-27 NOTE — PROGRESS NOTES
Chief Complaint   Patient presents with    Labs     DISCUSSION FROM INFECTIOUS DISEASE; EXTREME FATIGUE    Arthritis     REFERRAL FOR ARTHRITIS SPECIALIST (FEET)     1. Have you been to the ER, urgent care clinic since your last visit? Hospitalized since your last visit? No    2. Have you seen or consulted any other health care providers outside of the [address] [place] [address] since your last visit? Include any pap smears or colon screening. No     There are no preventive care reminders to display for this patient.
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
## 2017-09-11 ENCOUNTER — TELEPHONE (OUTPATIENT)
Dept: INTERNAL MEDICINE CLINIC | Age: 52
End: 2017-09-11

## 2017-09-11 NOTE — TELEPHONE ENCOUNTER
Patient called in reference to her appt with Dr. Caitlyn Sky. She said they cant see her until 2 months. Ms. Yolie Feldman said that they said if the doctor calls she may can get an earlier appt. Please call her at [phone]. 

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
## 2017-09-12 DIAGNOSIS — M19.079 ARTHRITIS OF FOOT: Primary | ICD-10-CM

## 2017-09-18 ENCOUNTER — TELEPHONE (OUTPATIENT)
Dept: INTERNAL MEDICINE CLINIC | Age: 52
End: 2017-09-18

## 2017-09-18 NOTE — TELEPHONE ENCOUNTER
Chief Complaint   Patient presents with    Appointment     RHEUMATOLOGY (DR. Waymond Goltz)     Left message for the  to return my call to schedule an appointment.

## 2017-09-22 NOTE — TELEPHONE ENCOUNTER
Chief Complaint   Patient presents with    Appointment     RHEUMATOLOGY (DR. Jennifer Rich)     Left message in general mailbox in regards to appointment that needs to be scheduled.

## 2017-09-25 ENCOUNTER — CLINICAL SUPPORT (OUTPATIENT)
Dept: INTERNAL MEDICINE CLINIC | Age: 52
End: 2017-09-25

## 2017-09-25 VITALS — TEMPERATURE: 97.9 F

## 2017-09-25 DIAGNOSIS — Z23 ENCOUNTER FOR IMMUNIZATION: Primary | ICD-10-CM

## 2017-09-25 NOTE — PROGRESS NOTES
Dominic Mcdaniel is a 54 y.o. female who presents for routine immunizations. - She denies any symptoms , reactions or allergies that would exclude them from being immunized today - Risks and adverse reactions were discussed and the VIS was given to them. All questions were addressed - 0.5ml Influenza Vaccine given IM to right deltoid per verbal order of Tyrone Unger NP - She was observed for 10 min post injection.  There were no reactions observed - Ulices Renner, LPN

## 2017-09-29 ENCOUNTER — TELEPHONE (OUTPATIENT)
Dept: INTERNAL MEDICINE CLINIC | Age: 52
End: 2017-09-29

## 2017-09-29 NOTE — TELEPHONE ENCOUNTER
Chief Complaint   Patient presents with    Immunization/Injection     Informed patient that an appointment is needed for titers and PPD testing. Appointment scheduled.

## 2017-10-03 ENCOUNTER — OFFICE VISIT (OUTPATIENT)
Dept: INTERNAL MEDICINE CLINIC | Age: 52
End: 2017-10-03

## 2017-10-03 VITALS
RESPIRATION RATE: 16 BRPM | TEMPERATURE: 97.6 F | DIASTOLIC BLOOD PRESSURE: 88 MMHG | OXYGEN SATURATION: 100 % | BODY MASS INDEX: 39.16 KG/M2 | SYSTOLIC BLOOD PRESSURE: 135 MMHG | WEIGHT: 264.4 LBS | HEART RATE: 78 BPM | HEIGHT: 69 IN

## 2017-10-03 DIAGNOSIS — Z23 ENCOUNTER FOR IMMUNIZATION: ICD-10-CM

## 2017-10-03 DIAGNOSIS — K21.9 GASTROESOPHAGEAL REFLUX DISEASE WITHOUT ESOPHAGITIS: Primary | ICD-10-CM

## 2017-10-03 RX ORDER — ESOMEPRAZOLE MAGNESIUM 20 MG/1
1 TABLET, DELAYED RELEASE ORAL DAILY
Qty: 30 TAB | Refills: 5 | Status: CANCELLED | OUTPATIENT
Start: 2017-10-03

## 2017-10-03 NOTE — PROGRESS NOTES
Chief Complaint   Patient presents with    Immunization/Injection     SCHOOL PHYSICAL     1. Have you been to the ER, urgent care clinic since your last visit? Hospitalized since your last visit? No    2. Have you seen or consulted any other health care providers outside of the [address] [place] [address] since your last visit? Include any pap smears or colon screening. No     There are no preventive care reminders to display for this patient.

## 2017-10-03 NOTE — MR AVS SNAPSHOT
Visit Information Date & Time Provider Department Dept. Phone Encounter #  
 10/3/2017 10:00 AM Nelli Lezama NP [facility] [phone] Follow-up Instructions Return if symptoms worsen or fail to improve. Your Appointments 10/27/2017 10:30 AM  
ROUTINE CARE with Diaz Cristobal MD  
[facility]) Appt Note: follow up 2 weeks am 10.06.2016; [facility] Woman $0CP  2.22.17  
 [address] [facility]. [address] [address] [address] [place] [address] [address] [address] Upcoming Health Maintenance Date Due  
 EYE EXAM RETINAL OR DILATED Q1 5/8/2018* FOOT EXAM Q1 12/2/2017 MICROALBUMIN Q1 12/2/2017 HEMOGLOBIN A1C Q6M 1/20/2018 LIPID PANEL Q1 7/20/2018 PAP AKA CERVICAL CYTOLOGY 7/28/2018 DTaP/Tdap/Td series (2 - Td) 1/18/2026 *Topic was postponed. The date shown is not the original due date. Allergies as of 10/3/2017  Review Complete On: 10/3/2017 By: Amna Mcwilliams LPN Severity Noted Reaction Type Reactions Cymbalta [Duloxetine]  05/10/2012    Other (comments)  
 dizziness Zithromax [Azithromycin]  11/17/2014    Other (comments) Current Immunizations  Reviewed on 9/25/2017 Name Date HEP A/HEP B Combined Vaccine 1/21/2011 Influenza Vaccine 10/30/2016, 10/15/2012 Influenza Vaccine (Quad) PF 9/25/2017 Influenza Vaccine Intradermal PF 11/18/2014 Influenza Vaccine Split 9/22/2011, 1/14/2011 PPD 10/12/2012, 1/21/2011 Pneumococcal Conjugate (PCV-13) 11/12/2015 Pneumococcal Polysaccharide (PPSV-23) 1/31/2011 Tdap 1/18/2016 ZZZ-RETIRED (DO NOT USE) Pneumococcal Vaccine (Unspecified Type) 1/14/2011 Not reviewed this visit You Were Diagnosed With   
  
 Codes Comments Encounter for immunization    -  Primary ICD-10-CM: C07 ICD-9-CM: V03.89  Gastroesophageal reflux disease without esophagitis ICD-10-CM: K21.9 ICD-9-CM: 530.81 Vitals BP Pulse Temp Resp Height(growth percentile) Weight(growth percentile) 135/88 (BP 1 Location: Left arm, BP Patient Position: Sitting) 78 97.6 °F (36.4 °C) (Temporal) 16 5' 9\" (1.753 m) 264 lb 6.4 oz (119.9 kg) LMP SpO2 BMI OB Status Smoking Status 05/01/2012 100% 39.05 kg/m2 Hysterectomy Never Smoker Vitals History BMI and BSA Data Body Mass Index Body Surface Area 39.05 kg/m 2 2.42 m 2 Preferred Pharmacy Pharmacy Name Phone [facility] [address] [facility], [address] [facility] [phone] Your Updated Medication List  
  
   
This list is accurate as of: 10/3/17 10:56 AM.  Always use your most recent med list. amLODIPine-benazepril 10-20 mg per capsule Commonly known as:  Blaise Setters Take 1 Cap by mouth daily. * buPROPion  mg tablet Commonly known as:  WELLBUTRIN XL  
  
 * buPROPion  mg XL tablet Commonly known as:  WELLBUTRIN XL  
  
 estradiol 2 mg tablet Commonly known as:  ESTRACE Take 1 Tab by mouth daily. Indications: VASOMOTOR SYMPTOMS ASSOCIATED WITH MENOPAUSE  
  
 GENVOYA Tab tablet Generic drug:  elvitegravir-cobicistat-emtricitabine-tenofovir alafenamide  
  
 hydroCHLOROthiazide 25 mg tablet Commonly known as:  HYDRODIURIL Take 1 Tab by mouth daily. hydrocortisone 2.5 % topical cream  
Commonly known as:  HYTONE Apply  to affected area two (2) times a day. use thin layer  
  
 hydrOXYzine pamoate 25 mg capsule Commonly known as:  VISTARIL MIRALAX 17 gram/dose powder Generic drug:  polyethylene glycol Take as a split dose as directed. NexIUM 24HR 22.3 mg Cpdr  
Generic drug:  esomeprazole magnesium Take 1 Tab by mouth daily. TESTOSTERONE CREAM  
Testosterone 1% in vanishing cream or ointment. Apply 1 ml externally twice per day. * Notice:   This list has 2 medication(s) that are the same as other medications prescribed for you. Read the directions carefully, and ask your doctor or other care provider to review them with you. Prescriptions Sent to Pharmacy Refills NEXIUM 24HR 22.3 mg cpDR 1 Sig: Take 1 Tab by mouth daily. Class: Normal  
 Pharmacy: [facility] [address], [address] Ph #: [phone] Route: Oral  
  
We Performed the Following HEPATITIS B SURF AB QUANT [id number] CPT(R)] MEASLES/MUMPS/RUBELLA IMMUNITY [id number] CPT(R)] VZV AB, IGG [id number] CPT(R)] Follow-up Instructions Return if symptoms worsen or fail to improve. Introducing [facility] SERVICES! Dear Herlinda Costa: 
Thank you for requesting a Supercell account. Our records indicate that you already have an active Supercell account. You can access your account anytime at [email or web address] Did you know that you can access your hospital and ER discharge instructions at any time in Supercell? You can also review all of your test results from your hospital stay or ER visit. Additional Information If you have questions, please visit the Frequently Asked Questions section of the Supercell website at [email or web address] Remember, Supercell is NOT to be used for urgent needs. For medical emergencies, dial 911. Now available from your iPhone and Android! Please provide this summary of care documentation to your next provider. Your primary care clinician is listed as Elsa Massey. If you have any questions after today's visit, please call [phone].

## 2017-10-03 NOTE — PROGRESS NOTES
HISTORY OF PRESENT ILLNESS  Sarah Kim is a 54 y.o. female. HPI  Chief Complaint   Patient presents with    Immunization/Injection   Patient in for request for immunizations for class. Review of MR indicates need immunity status for MMR and HEP B, PPD, Varicella. Request refil medications. Review of Systems   Constitutional: Negative. HENT: Negative. Eyes: Negative. Respiratory: Negative. Cardiovascular: Negative. Gastrointestinal: Negative. Genitourinary: Negative. Musculoskeletal: Negative. Skin: Negative. Physical Exam   Constitutional: She is oriented to person, place, and time. She appears well-developed and well-nourished. HENT:   Head: Normocephalic and atraumatic. Eyes: Conjunctivae are normal.   Neck: Normal range of motion. Neck supple. Cardiovascular: Normal rate, regular rhythm and intact distal pulses. Exam reveals no gallop and no friction rub. No murmur heard. Pulmonary/Chest: Effort normal and breath sounds normal. No respiratory distress. She has no wheezes. She has no rales. Musculoskeletal: Normal range of motion. Neurological: She is alert and oriented to person, place, and time. Skin: Skin is warm and dry. Psychiatric: She has a normal mood and affect. Her behavior is normal. Thought content normal.   Nursing note and vitals reviewed. Plan of care and AVS reviewed with patient who verbalized understanding. ASSESSMENT and PLAN    ICD-10-CM ICD-9-CM    1. Gastroesophageal reflux disease without esophagitis K21.9 530.81    2. Encounter for immunization Z23 V03.89 MEASLES/MUMPS/RUBELLA IMMUNITY      VZV AB, IGG      HEPATITIS B SURF AB QUANT      AMB POC TUBERCULOSIS, INTRADERMAL (SKIN TEST)   Will notify patient of results of lab  F/u 72 hours PPD reading.

## 2017-10-05 LAB
HBV SURFACE AB SER-ACNC: 74.3 MIU/ML
MEV IGG SER IA-ACNC: 29.7 AU/ML
MUV IGG SER IA-ACNC: >300 AU/ML
RUBV IGG SERPL IA-ACNC: <0.9 INDEX
VZV IGG SER IA-ACNC: >4000 INDEX

## 2017-10-06 ENCOUNTER — CLINICAL SUPPORT (OUTPATIENT)
Dept: INTERNAL MEDICINE CLINIC | Age: 52
End: 2017-10-06

## 2017-10-06 VITALS
SYSTOLIC BLOOD PRESSURE: 128 MMHG | OXYGEN SATURATION: 100 % | TEMPERATURE: 96.9 F | HEART RATE: 72 BPM | DIASTOLIC BLOOD PRESSURE: 85 MMHG | RESPIRATION RATE: 16 BRPM

## 2017-10-06 DIAGNOSIS — Z23 NEED FOR MEASLES-MUMPS-RUBELLA (MMR) VACCINE: ICD-10-CM

## 2017-10-06 DIAGNOSIS — Z23 ENCOUNTER FOR IMMUNIZATION: Primary | ICD-10-CM

## 2017-10-06 LAB
MM INDURATION POC: 0 MM (ref 0–5)
PPD POC: NEGATIVE NEGATIVE

## 2017-10-06 NOTE — PROGRESS NOTES
Patient in for PPD reading - negative results - letter given stating this - per verbal order of David Major NP immunization given to patient of 0.5cc MMR vaccine SC to left arm due to low immunity on titer results - no reaction at er 10 minute wait - patient getting ready to start EKG school  Brittani Friday, LPN  44/1/0730  31:80 AM

## 2017-10-06 NOTE — LETTER
10/6/2017 11:19 AM 
 
Ms. Madie Bennett [address] [facility] [address] Dear Madielacey Bennett I have reviewed your results and have found the results listed below to be within normal ranges. PPD placed to left forearm on 10/3/17 My recommendations are as follows: 
Negative PPD results read on 10/6/17 Please call if you have any questions [phone] . Sincerely, 
 
 
 
Jean Kessler NP

## 2017-11-27 ENCOUNTER — TELEPHONE (OUTPATIENT)
Dept: INTERNAL MEDICINE CLINIC | Age: 52
End: 2017-11-27

## 2017-12-05 ENCOUNTER — TELEPHONE (OUTPATIENT)
Dept: INTERNAL MEDICINE CLINIC | Age: 52
End: 2017-12-05

## 2017-12-05 NOTE — TELEPHONE ENCOUNTER
Pt stated that when she went to the pharmacy to p/u her meds they adv that it was not authorized, stated that she would like alexandre to call her back in ref to the Authorization

## 2017-12-08 ENCOUNTER — DOCUMENTATION ONLY (OUTPATIENT)
Dept: INTERNAL MEDICINE CLINIC | Age: 52
End: 2017-12-08

## 2017-12-08 NOTE — PROGRESS NOTES
Chief Complaint   Patient presents with    Medication Refill     [address]     *Late Entry*--Spoke with Asif Ortega Pharmacist) yesterday ([phone]) and informed her that it was permissible to use the generic form for Nexium 24HR OTC per Phillip England DNP.

## 2018-01-08 ENCOUNTER — OFFICE VISIT (OUTPATIENT)
Dept: INTERNAL MEDICINE CLINIC | Age: 53
End: 2018-01-08

## 2018-01-08 VITALS
SYSTOLIC BLOOD PRESSURE: 149 MMHG | HEIGHT: 69 IN | HEART RATE: 69 BPM | DIASTOLIC BLOOD PRESSURE: 95 MMHG | BODY MASS INDEX: 39.69 KG/M2 | TEMPERATURE: 95.9 F | OXYGEN SATURATION: 100 % | WEIGHT: 268 LBS | RESPIRATION RATE: 20 BRPM

## 2018-01-08 DIAGNOSIS — Z01.818 PRE-OP EXAM: Primary | ICD-10-CM

## 2018-01-08 DIAGNOSIS — Z12.39 SCREENING FOR BREAST CANCER: ICD-10-CM

## 2018-01-08 DIAGNOSIS — Z12.11 SCREENING FOR COLON CANCER: ICD-10-CM

## 2018-01-08 DIAGNOSIS — B20 HIV (HUMAN IMMUNODEFICIENCY VIRUS INFECTION) (HCC): ICD-10-CM

## 2018-01-08 DIAGNOSIS — E11.9 CONTROLLED TYPE 2 DIABETES MELLITUS WITHOUT COMPLICATION, WITHOUT LONG-TERM CURRENT USE OF INSULIN (HCC): ICD-10-CM

## 2018-01-08 NOTE — PROGRESS NOTES
Preop for foot surgery - 1 foot 2 foot - Dr Marina Carson - 1/22/18  Caren Shafer LPN  2/8/0600  8:54 PM

## 2018-01-08 NOTE — MR AVS SNAPSHOT
Visit Information Date & Time Provider Department Dept. Phone Encounter #  
 1/8/2018  3:00 PM Deborah Amezcua NP [address] [facility] [phone] Follow-up Instructions Return in about 6 months (around 7/8/2018) for Diabetes. Follow-up and Disposition History Upcoming Health Maintenance Date Due  
 BREAST CANCER SCRN MAMMOGRAM 3/30/2018* FOBT Q 1 YEAR AGE 50-75 3/30/2018* EYE EXAM RETINAL OR DILATED Q1 5/8/2018* HEMOGLOBIN A1C Q6M 5/30/2018* LIPID PANEL Q1 7/20/2018 PAP AKA CERVICAL CYTOLOGY 7/28/2018 FOOT EXAM Q1 1/8/2019 MICROALBUMIN Q1 1/8/2019 DTaP/Tdap/Td series (2 - Td) 1/18/2026 *Topic was postponed. The date shown is not the original due date. Allergies as of 1/8/2018  Review Complete On: 1/8/2018 By: Deborah Amezcua NP Severity Noted Reaction Type Reactions Cymbalta [Duloxetine]  05/10/2012    Other (comments)  
 dizziness Zithromax [Azithromycin]  11/17/2014    Other (comments) Current Immunizations  Reviewed on 10/6/2017 Name Date HEP A/HEP B Combined Vaccine 1/21/2011 Influenza Vaccine 10/30/2016, 10/15/2012 Influenza Vaccine (Quad) PF 9/25/2017 Influenza Vaccine Intradermal PF 11/18/2014 Influenza Vaccine Split 9/22/2011, 1/14/2011 MMR 10/6/2017 PPD 10/12/2012, 1/21/2011 Pneumococcal Conjugate (PCV-13) 11/12/2015 Pneumococcal Polysaccharide (PPSV-23) 1/31/2011 TB Skin Test (PPD) Intradermal 10/6/2017 Tdap 1/18/2016 ZZZ-RETIRED (DO NOT USE) Pneumococcal Vaccine (Unspecified Type) 1/14/2011 Not reviewed this visit You Were Diagnosed With   
  
 Codes Comments Pre-op exam    -  Primary ICD-10-CM: Q80.019 ICD-9-CM: V72.84 Screening for colon cancer     ICD-10-CM: Z12.11 ICD-9-CM: V76.51 Screening for breast cancer     ICD-10-CM: Z12.31 
ICD-9-CM: V76.10  Controlled type 2 diabetes mellitus without complication, without long-term current use of insulin ([facility]     ICD-10-CM: E11.9 ICD-9-CM: 250.00   
 HIV (human immunodeficiency virus infection) ([facility].)     ICD-10-CM: B20 
ICD-9-CM: V08 Vitals BP Pulse Temp Resp Height(growth percentile) Weight(growth percentile) (!) 149/95 (BP 1 Location: Left arm, BP Patient Position: At rest) 69 95.9 °F (35.5 °C) (Oral) 20 5' 9\" (1.753 m) 268 lb (121.6 kg) LMP SpO2 BMI OB Status Smoking Status 12/19/2012 100% 39.58 kg/m2 Hysterectomy Never Smoker Vitals History BMI and BSA Data Body Mass Index Body Surface Area  
 39.58 kg/m 2 2.43 m 2 Preferred Pharmacy Pharmacy Name Phone [facility] [address] [facility] [address] [facility] [phone] Your Updated Medication List  
  
   
This list is accurate as of: 1/8/18 11:59 PM.  Always use your most recent med list. amLODIPine-benazepril 10-20 mg per capsule Commonly known as:  Neldon Kris Take 1 Cap by mouth daily. * buPROPion  mg tablet Commonly known as:  WELLBUTRIN XL  
  
 * buPROPion  mg XL tablet Commonly known as:  WELLBUTRIN XL  
  
 estradiol 2 mg tablet Commonly known as:  ESTRACE Take 1 Tab by mouth daily. Indications: VASOMOTOR SYMPTOMS ASSOCIATED WITH MENOPAUSE  
  
 GENVOYA Tab tablet Generic drug:  elvitegravir-cobicistat-emtricitabine-tenofovir alafenamide  
  
 hydroCHLOROthiazide 25 mg tablet Commonly known as:  HYDRODIURIL Take 1 Tab by mouth daily. hydrocortisone 2.5 % topical cream  
Commonly known as:  HYTONE Apply  to affected area two (2) times a day. use thin layer  
  
 hydrOXYzine pamoate 25 mg capsule Commonly known as:  VISTARIL MIRALAX 17 gram/dose powder Generic drug:  polyethylene glycol Take as a split dose as directed. NexIUM 24HR 22.3 mg Cpdr  
Generic drug:  esomeprazole magnesium Take 1 Tab by mouth daily. TESTOSTERONE CREAM  
Testosterone 1% in vanishing cream or ointment.  Apply 1 ml externally twice per day. * Notice: This list has 2 medication(s) that are the same as other medications prescribed for you. Read the directions carefully, and ask your doctor or other care provider to review them with you. We Performed the Following MICROALBUMIN, UR, RAND W/ MICROALBUMIN/CREA RATIO [id number] CPT(R)] OCCULT BLOOD, IMMUNOASSAY (FIT) [id number] CPT(R)] Follow-up Instructions Return in about 6 months (around 7/8/2018) for Diabetes. To-Do List   
 01/08/2018 Imaging:  [facility] BI SCREENING INCL CAD   
  
 03/12/2018 11:00 AM  
  Appointment with [address] 1 at [address] [place] [address] Mammography ([phone]) EXAM INSTRUCTIONS Do not wear deodorant, lotion, or powders to your appointment. GENERAL INSTRUCTIONS - Bring a list of all medications you are currently taking, including over the counter medications. - Only patients will be allowed in the exam room. This includes children. - Children under the age of 15 may not be left unattended. - [address] [place] [address] patients must have an armband and be accompanied by a caregiver or family member. - If you have a hand-written prescription for this exam, you must bring it with you on the day of your exam. - Bring all relevant prior images from any facility outside of [employer] with you on the day of your exam.  Failure to provide images may delay reading by Radiologist.  If you have questions or need to reschedule or cancel your appointment, call [phone]. Introducing [facility] & HEALTH SERVICES! Dear Nikolas Nunez: 
Thank you for requesting a BaroFold account. Our records indicate that you already have an active BaroFold account. You can access your account anytime at [email or web address] Did you know that you can access your hospital and ER discharge instructions at any time in BaroFold? You can also review all of your test results from your hospital stay or ER visit. Additional Information If you have questions, please visit the Frequently Asked Questions section of the Medstrohart website at [email or web address] Remember, Mobile Armor is NOT to be used for urgent needs. For medical emergencies, dial 911. Now available from your iPhone and Android! Please provide this summary of care documentation to your next provider. Your primary care clinician is listed as Pat Sotelo. If you have any questions after today's visit, please call [phone].

## 2018-01-09 LAB
ALBUMIN/CREAT UR: 21.3 MG/G CREAT (ref 0–30)
CREAT UR-MCNC: 285.3 MG/DL
MICROALBUMIN UR-MCNC: 60.9 UG/ML

## 2018-01-10 NOTE — PROGRESS NOTES
HISTORY OF PRESENT ILLNESS  Keith Calderon is a 50 y.o. female. HPI  Chief Complaint   Patient presents with    Foot Pain     pre op of foot surgery     Patient in for pre-op evaluation. States will have hardware removed from right foot. States has constant pain related to hardware. Review of MR HIV stable. Review of Systems   Constitutional: Negative. Negative for chills, fever and malaise/fatigue. HENT: Negative. Eyes: Negative. Respiratory: Negative. Negative for cough, shortness of breath and wheezing. Cardiovascular: Negative. Negative for chest pain and leg swelling. Gastrointestinal: Negative. Negative for constipation, diarrhea, nausea and vomiting. Genitourinary: Negative. Musculoskeletal: Negative. Skin: Negative. Neurological: Negative. Physical Exam   Constitutional: She is oriented to person, place, and time. She appears well-developed and well-nourished. No distress. HENT:   Head: Normocephalic and atraumatic. Eyes: Conjunctivae are normal.   Neck: Normal range of motion. Neck supple. Cardiovascular: Normal rate, regular rhythm, normal heart sounds and intact distal pulses. Exam reveals no gallop and no friction rub. No murmur heard. Pulmonary/Chest: Effort normal and breath sounds normal. No respiratory distress. She has no wheezes. She has no rales. Abdominal: Soft. Bowel sounds are normal. She exhibits no distension. There is no tenderness. There is no rebound and no guarding. Musculoskeletal: Normal range of motion. Hard to get boot off right foot  Positive for edema   Neurological: She is alert and oriented to person, place, and time. Skin: Skin is warm and dry. She is not diaphoretic. Psychiatric: She has a normal mood and affect. Her behavior is normal. Judgment and thought content normal.   Nursing note and vitals reviewed.       Diabetic foot exam performed by Evan Goodwin NP     Measurement  Response Nurse Comment Physician Comment   Monofilament  R - normal sensation with micro filament  L - normal sensation with micro filament     Pulse DP R - present  L - present     Pulse TP R - present  L - present     Structural deformity R - None  L - None     Skin Integrity / Deformity R - None  L - None       Plan of care and AVS reviewed with patient who verbalized understanding. ASSESSMENT and PLAN    ICD-10-CM ICD-9-CM    1. Pre-op exam Z01.818 V72.84    2. Screening for colon cancer Z12.11 V76.51 OCCULT BLOOD, IMMUNOASSAY (FIT)   3. Screening for breast cancer Z12.31 V76.10 [facility] BI SCREENING INCL CAD   4. Controlled type 2 diabetes mellitus without complication, without long-term current use of insulin (HCC) E11.9 250.00 MICROALBUMIN, UR, RAND W/ MICROALBUMIN/CREA RATIO   Will await Pre-op form for labs due. F/U 6 months Diabetes.

## 2018-01-11 ENCOUNTER — OFFICE VISIT (OUTPATIENT)
Dept: INTERNAL MEDICINE CLINIC | Age: 53
End: 2018-01-11

## 2018-01-11 VITALS
BODY MASS INDEX: 39.84 KG/M2 | TEMPERATURE: 97.5 F | HEART RATE: 71 BPM | HEIGHT: 69 IN | DIASTOLIC BLOOD PRESSURE: 75 MMHG | RESPIRATION RATE: 16 BRPM | OXYGEN SATURATION: 100 % | SYSTOLIC BLOOD PRESSURE: 114 MMHG | WEIGHT: 269 LBS

## 2018-01-11 DIAGNOSIS — Z01.818 PRE-OP TESTING: ICD-10-CM

## 2018-01-11 DIAGNOSIS — Z01.818 PRE-OP EVALUATION: Primary | ICD-10-CM

## 2018-01-11 DIAGNOSIS — M79.671 RIGHT FOOT PAIN: Primary | ICD-10-CM

## 2018-01-11 DIAGNOSIS — M79.671 RIGHT FOOT PAIN: ICD-10-CM

## 2018-01-12 LAB
ALBUMIN SERPL-MCNC: 4.4 G/DL (ref 3.5–5.5)
ALBUMIN/GLOB SERPL: 1.3 {RATIO} (ref 1.2–2.2)
ALP SERPL-CCNC: 106 IU/L (ref 39–117)
ALT SERPL-CCNC: 15 IU/L (ref 0–32)
AST SERPL-CCNC: 13 IU/L (ref 0–40)
BASOPHILS # BLD AUTO: 0 X10E3/UL (ref 0–0.2)
BASOPHILS NFR BLD AUTO: 0 %
BILIRUB SERPL-MCNC: 0.3 MG/DL (ref 0–1.2)
BUN SERPL-MCNC: 12 MG/DL (ref 6–24)
BUN/CREAT SERPL: 13 (ref 9–23)
CALCIUM SERPL-MCNC: 9.5 MG/DL (ref 8.7–10.2)
CHLORIDE SERPL-SCNC: 100 MMOL/L (ref 96–106)
CO2 SERPL-SCNC: 26 MMOL/L (ref 18–29)
CREAT SERPL-MCNC: 0.9 MG/DL (ref 0.57–1)
EOSINOPHIL # BLD AUTO: 0.1 X10E3/UL (ref 0–0.4)
EOSINOPHIL NFR BLD AUTO: 1 %
ERYTHROCYTE [DISTWIDTH] IN BLOOD BY AUTOMATED COUNT: 15.2 % (ref 12.3–15.4)
GLOBULIN SER CALC-MCNC: 3.5 G/DL (ref 1.5–4.5)
GLUCOSE SERPL-MCNC: 77 MG/DL (ref 65–99)
HCT VFR BLD AUTO: 39.6 % (ref 34–46.6)
HGB BLD-MCNC: 12.7 G/DL (ref 11.1–15.9)
IMM GRANULOCYTES # BLD: 0 X10E3/UL (ref 0–0.1)
IMM GRANULOCYTES NFR BLD: 0 %
LYMPHOCYTES # BLD AUTO: 1.5 X10E3/UL (ref 0.7–3.1)
LYMPHOCYTES NFR BLD AUTO: 36 %
MCH RBC QN AUTO: 29.3 PG (ref 26.6–33)
MCHC RBC AUTO-ENTMCNC: 32.1 G/DL (ref 31.5–35.7)
MCV RBC AUTO: 91 FL (ref 79–97)
MONOCYTES # BLD AUTO: 0.4 X10E3/UL (ref 0.1–0.9)
MONOCYTES NFR BLD AUTO: 10 %
NEUTROPHILS # BLD AUTO: 2.2 X10E3/UL (ref 1.4–7)
NEUTROPHILS NFR BLD AUTO: 53 %
PLATELET # BLD AUTO: 314 X10E3/UL (ref 150–379)
POTASSIUM SERPL-SCNC: 4 MMOL/L (ref 3.5–5.2)
PROT SERPL-MCNC: 7.9 G/DL (ref 6–8.5)
RBC # BLD AUTO: 4.34 X10E6/UL (ref 3.77–5.28)
SODIUM SERPL-SCNC: 142 MMOL/L (ref 134–144)
WBC # BLD AUTO: 4.1 X10E3/UL (ref 3.4–10.8)

## 2018-01-20 LAB — HEMOCCULT STL QL IA: NEGATIVE

## 2018-02-13 ENCOUNTER — TELEPHONE (OUTPATIENT)
Dept: INTERNAL MEDICINE CLINIC | Age: 53
End: 2018-02-13

## 2018-02-13 NOTE — TELEPHONE ENCOUNTER
Chief Complaint   Patient presents with    Request For New Medication     Requested patient to call insurance company to verify which Rx would her insurance cover and I would forward her requests to Alana To DNP. Understanding verbalized by the patient.

## 2018-02-13 NOTE — TELEPHONE ENCOUNTER
Pt called wanted to know if dr Merissa Moody could call her in something else other then the nexium b/c she is getting a little fed up with her insurance company b/c every time she had to get the nexium refilled the insurance tells her that they do not want to pay for it and that they would need ms madiha to do more paperwork and she need a PA and sttd that when that takes place it takes them an additional 2 weeks to another month to get it and before she knows it its time for a refill and they make her go through the process again     sttd that her pharmacy has Cnaged to [facility]/king Lázaro Rain Phone : [phone]

## 2018-02-19 LAB
HBA1C MFR BLD HPLC: NORMAL %
LDL-C, EXTERNAL: NORMAL

## 2018-02-22 ENCOUNTER — DOCUMENTATION ONLY (OUTPATIENT)
Dept: INTERNAL MEDICINE CLINIC | Age: 53
End: 2018-02-22

## 2018-03-12 LAB — MAMMOGRAPHY, EXTERNAL: NORMAL

## 2018-03-16 ENCOUNTER — OFFICE VISIT (OUTPATIENT)
Dept: INTERNAL MEDICINE CLINIC | Age: 53
End: 2018-03-16

## 2018-03-16 VITALS
OXYGEN SATURATION: 99 % | DIASTOLIC BLOOD PRESSURE: 81 MMHG | HEIGHT: 69 IN | TEMPERATURE: 97 F | WEIGHT: 272 LBS | SYSTOLIC BLOOD PRESSURE: 124 MMHG | RESPIRATION RATE: 18 BRPM | HEART RATE: 70 BPM | BODY MASS INDEX: 40.29 KG/M2

## 2018-03-16 DIAGNOSIS — I10 ESSENTIAL HYPERTENSION WITH GOAL BLOOD PRESSURE LESS THAN 130/85: ICD-10-CM

## 2018-03-16 DIAGNOSIS — F33.9 EPISODE OF RECURRENT MAJOR DEPRESSIVE DISORDER, UNSPECIFIED DEPRESSION EPISODE SEVERITY (HCC): Primary | ICD-10-CM

## 2018-03-16 RX ORDER — BUPROPION HYDROCHLORIDE 300 MG/1
300 TABLET ORAL
Qty: 30 TAB | Refills: 1 | Status: SHIPPED | OUTPATIENT
Start: 2018-03-16 | End: 2018-04-02 | Stop reason: ALTCHOICE

## 2018-03-16 RX ORDER — HYDROCHLOROTHIAZIDE 25 MG/1
25 TABLET ORAL DAILY
Qty: 30 TAB | Refills: 5 | Status: CANCELLED | OUTPATIENT
Start: 2018-03-16

## 2018-03-16 NOTE — PROGRESS NOTES
HISTORY OF PRESENT ILLNESS  Luke Holliday is a 50 y.o. female. HPI  Chief Complaint   Patient presents with    Depression     X 1 [address] [facility]. 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  has been off medication x 2 years so that she could be employable. States new is having crying episodes and need to get back on medication. Denies SI. Last documented medication for depression was wellbutrin  Patient would like to restart that mediction. Patient  is looking for psychiatrist.  [facility] checked on has long waiting list.    Review of Systems   Constitutional: Negative for chills, fever and malaise/fatigue. HENT: Negative. Eyes: Negative. Respiratory: Negative. Negative for cough, shortness of breath and wheezing. Cardiovascular: Negative. Negative for chest pain and leg swelling. Gastrointestinal: Negative for abdominal pain, constipation, diarrhea, nausea and vomiting. Genitourinary: Negative. Musculoskeletal: Negative. Skin: Negative. Neurological: Negative. Physical Exam   Constitutional: She is oriented to person, place, and time. She appears well-developed and well-nourished. HENT:   Head: Normocephalic and atraumatic. Eyes: Conjunctivae are normal.   Neck: Normal range of motion. Neck supple. Cardiovascular: Normal rate, regular rhythm, normal heart sounds and intact distal pulses. Exam reveals no gallop and no friction rub. No murmur heard. Pulmonary/Chest: Effort normal and breath sounds normal. No respiratory distress. She has no wheezes. Musculoskeletal: Normal range of motion. Neurological: She is alert and oriented to person, place, and time. Skin: Skin is warm and dry. She is not diaphoretic. Nursing note and vitals reviewed. Plan of care and AVS reviewed with patient who verbalized understanding.     ASSESSMENT and PLAN    ICD-10-CM ICD-9-CM    1. Episode of recurrent major depressive disorder, unspecified depression episode severity (HCC) F33.9 296.30 buPROPion XL (WELLBUTRIN XL) 300 mg XL tablet   Restarted wellbutrin. Reviewed with patient use and SE.  F/U 1 month for med evaluation.

## 2018-03-16 NOTE — PATIENT INSTRUCTIONS
Body Mass Index: Care Instructions  Your Care Instructions    Body mass index (BMI) can help you see if your weight is raising your risk for health problems. It uses a formula to compare how much you weigh with how tall you are. · A BMI lower than 18.5 is considered underweight. · A BMI between 18.5 and 24.9 is considered healthy. · A BMI between 25 and 29.9 is considered overweight. A BMI of 30 or higher is considered obese. If your BMI is in the normal range, it means that you have a lower risk for weight-related health problems. If your BMI is in the overweight or obese range, you may be at increased risk for weight-related health problems, such as high blood pressure, heart disease, stroke, arthritis or joint pain, and diabetes. If your BMI is in the underweight range, you may be at increased risk for health problems such as fatigue, lower protection (immunity) against illness, muscle loss, bone loss, hair loss, and hormone problems. BMI is just one measure of your risk for weight-related health problems. You may be at higher risk for health problems if you are not active, you eat an unhealthy diet, or you drink too much alcohol or use tobacco products. Follow-up care is a key part of your treatment and safety. Be sure to make and go to all appointments, and call your doctor if you are having problems. It's also a good idea to know your test results and keep a list of the medicines you take. How can you care for yourself at home? · Practice healthy eating habits. This includes eating plenty of fruits, vegetables, whole grains, lean protein, and low-fat dairy. · If your doctor recommends it, get more exercise. Walking is a good choice. Bit by bit, increase the amount you walk every day. Try for at least 30 minutes on most days of the week. · Do not smoke. Smoking can increase your risk for health problems. If you need help quitting, talk to your doctor about stop-smoking programs and medicines. These can increase your chances of quitting for good. · Limit alcohol to 2 drinks a day for men and 1 drink a day for women. Too much alcohol can cause health problems. If you have a BMI higher than 25  · Your doctor may do other tests to check your risk for weight-related health problems. This may include measuring the distance around your waist. A waist measurement of more than 40 inches in men or 35 inches in women can increase the risk of weight-related health problems. · Talk with your doctor about steps you can take to stay healthy or improve your health. You may need to make lifestyle changes to lose weight and stay healthy, such as changing your diet and getting regular exercise. If you have a BMI lower than 18.5  · Your doctor may do other tests to check your risk for health problems. · Talk with your doctor about steps you can take to stay healthy or improve your health. You may need to make lifestyle changes to gain or maintain weight and stay healthy, such as getting more healthy foods in your diet and doing exercises to build muscle. Where can you learn more? Go to http://melly-chika.info/. Enter S176 in the search box to learn more about \"Body Mass Index: Care Instructions. \"  Current as of: October 13, 2016  Content Version: 11.4  © 1805-3876 Healthwise, Incorporated. Care instructions adapted under license by [employer] (which disclaims liability or warranty for this information). If you have questions about a medical condition or this instruction, always ask your healthcare professional. [address] any warranty or liability for your use of this information.

## 2018-03-16 NOTE — MR AVS SNAPSHOT
[address] [address] [facility] 
[phone] Patient: Leanna Acevedo MRN: [id number] :[id number] Visit Information Date & Time Provider Department Dept. Phone Encounter #  
 3/16/2018  8:45 AM Lewis Rosenberg, 1 [facility] [id number] [phone] Follow-up Instructions Return in about 1 month (around 2018) for Depression. Upcoming Health Maintenance Date Due  
 BREAST CANCER SCRN MAMMOGRAM 3/30/2018* EYE EXAM RETINAL OR DILATED Q1 2018* PAP AKA CERVICAL CYTOLOGY 2018 HEMOGLOBIN A1C Q6M 2018 FOOT EXAM Q1 2019 MICROALBUMIN Q1 2019 FOBT Q 1 YEAR AGE 50-75 2019 LIPID PANEL Q1 2019 DTaP/Tdap/Td series (2 - Td) 2026 *Topic was postponed. The date shown is not the original due date. Allergies as of 3/16/2018  Review Complete On: 3/16/2018 By: Lewis Rosenberg NP Severity Noted Reaction Type Reactions Cymbalta [Duloxetine]  05/10/2012    Other (comments)  
 dizziness Zithromax [Azithromycin]  2014    Other (comments) Current Immunizations  Reviewed on 10/6/2017 Name Date HEP A/HEP B Combined Vaccine 2011 Influenza Vaccine 10/30/2016, 10/15/2012 Influenza Vaccine (Quad) PF 2017 Influenza Vaccine Intradermal PF 2014 Influenza Vaccine Split 2011, 2011 MMR 10/6/2017 PPD 10/12/2012, 2011 Pneumococcal Conjugate (PCV-13) 2015 Pneumococcal Polysaccharide (PPSV-23) 2011 TB Skin Test (PPD) Intradermal 10/6/2017 Tdap 2016 ZZZ-RETIRED (DO NOT USE) Pneumococcal Vaccine (Unspecified Type) 2011 Not reviewed this visit You Were Diagnosed With   
  
 Codes Comments Episode of recurrent major depressive disorder, unspecified depression episode [facility]    -  Primary ICD-10-CM: F33.9 ICD-9-CM: 296.30 Vitals BP Pulse Temp Resp Height(growth percentile) Weight(growth percentile) 124/81 (BP 1 Location: Left arm, BP Patient Position: Sitting) 70 97 °F (36.1 °C) (Temporal) 18 5' 9\" (1.753 m) 272 lb (123.4 kg) LMP SpO2 BMI OB Status Smoking Status 12/19/2012 99% 40.17 kg/m2 Hysterectomy Never Smoker Vitals History BMI and BSA Data Body Mass Index Body Surface Area  
 40.17 kg/m 2 2.45 m 2 Preferred Pharmacy Pharmacy Name Phone [facility]/PHARMACY [address] [place] [phone] Your Updated Medication List  
  
   
This list is accurate as of 3/16/18  9:03 AM.  Always use your most recent med list. amLODIPine-benazepril 10-20 mg per capsule Commonly known as:  Neldon Kris Take 1 Cap by mouth daily. buPROPion  mg XL tablet Commonly known as:  Kary Kil Take 1 Tab by mouth every morning. estradiol 2 mg tablet Commonly known as:  ESTRACE Take 1 Tab by mouth daily. Indications: VASOMOTOR SYMPTOMS ASSOCIATED WITH MENOPAUSE  
  
 GENVOYA Tab tablet Generic drug:  elvitegravir-cobicistat-emtricitabine-tenofovir alafenamide  
  
 hydroCHLOROthiazide 25 mg tablet Commonly known as:  HYDRODIURIL Take 1 Tab by mouth daily. hydrocortisone 2.5 % topical cream  
Commonly known as:  HYTONE Apply  to affected area two (2) times a day. use thin layer MIRALAX 17 gram/dose powder Generic drug:  polyethylene glycol Take as a split dose as directed. NexIUM 24HR 22.3 mg Cpdr  
Generic drug:  esomeprazole magnesium Take 1 Tab by mouth daily. TESTOSTERONE CREAM  
Testosterone 1% in vanishing cream or ointment. Apply 1 ml externally twice per day. Prescriptions Sent to Pharmacy Refills buPROPion XL (WELLBUTRIN XL) 300 mg XL tablet 1 Sig: Take 1 Tab by mouth every morning.   
 Class: Normal  
 Pharmacy: [facility]/pharmacy [address] Ph #: [phone] Route: Oral  
  
Follow-up Instructions Return in about 1 month (around 4/16/2018) for Depression. Patient Instructions Body Mass Index: Care Instructions Your Care Instructions Body mass index (BMI) can help you see if your weight is raising your risk for health problems. It uses a formula to compare how much you weigh with how tall you are. · A BMI lower than 18.5 is considered underweight. · A BMI between 18.5 and 24.9 is considered healthy. · A BMI between 25 and 29.9 is considered overweight. A BMI of 30 or higher is considered obese. If your BMI is in the normal range, it means that you have a lower risk for weight-related health problems. If your BMI is in the overweight or obese range, you may be at increased risk for weight-related health problems, such as high blood pressure, heart disease, stroke, arthritis or joint pain, and diabetes. If your BMI is in the underweight range, you may be at increased risk for health problems such as fatigue, lower protection (immunity) against illness, muscle loss, bone loss, hair loss, and hormone problems. BMI is just one measure of your risk for weight-related health problems. You may be at higher risk for health problems if you are not active, you eat an unhealthy diet, or you drink too much alcohol or use tobacco products. Follow-up care is a key part of your treatment and safety. Be sure to make and go to all appointments, and call your doctor if you are having problems. It's also a good idea to know your test results and keep a list of the medicines you take. How can you care for yourself at home? · Practice healthy eating habits. This includes eating plenty of fruits, vegetables, whole grains, lean protein, and low-fat dairy. · If your doctor recommends it, get more exercise. Walking is a good choice. Bit by bit, increase the amount you walk every day. Try for at least 30 minutes on most days of the week.  
· Do not smoke. Smoking can increase your risk for health problems. If you need help quitting, talk to your doctor about stop-smoking programs and medicines. These can increase your chances of quitting for good. · Limit alcohol to 2 drinks a day for men and 1 drink a day for women. Too much alcohol can cause health problems. If you have a BMI higher than 25 · Your doctor may do other tests to check your risk for weight-related health problems. This may include measuring the distance around your waist. A waist measurement of more than 40 inches in men or 35 inches in women can increase the risk of weight-related health problems. · Talk with your doctor about steps you can take to stay healthy or improve your health. You may need to make lifestyle changes to lose weight and stay healthy, such as changing your diet and getting regular exercise. If you have a BMI lower than 18.5 · Your doctor may do other tests to check your risk for health problems. · Talk with your doctor about steps you can take to stay healthy or improve your health. You may need to make lifestyle changes to gain or maintain weight and stay healthy, such as getting more healthy foods in your diet and doing exercises to build muscle. Where can you learn more? Go to http://melly-chika.info/. Enter S176 in the search box to learn more about \"Body Mass Index: Care Instructions. \" Current as of: October 13, 2016 Content Version: 11.4 © 9608-5255 Healthwise, Incorporated. Care instructions adapted under license by [employer] (which disclaims liability or warranty for this information). If you have questions about a medical condition or this instruction, always ask your healthcare professional. [place] [address] any warranty or liability for your use of this information. Introducing [facility] & HEALTH SERVICES! Dear Kaushal Lea: 
Thank you for requesting a [facility] account.   Our records indicate that you already have an active [employer] account. You can access your account anytime at [email or web address] Did you know that you can access your hospital and ER discharge instructions at any time in [employer]? You can also review all of your test results from your hospital stay or ER visit. Additional Information If you have questions, please visit the Frequently Asked Questions section of the [employer] website at [email or web address] Remember, [employer] is NOT to be used for urgent needs. For medical emergencies, dial 911. Now available from your iPhone and Android! Please provide this summary of care documentation to your next provider. Your primary care clinician is listed as Philipp Myers. If you have any questions after today's visit, please call [phone].

## 2018-03-16 NOTE — PROGRESS NOTES
Chief Complaint   Patient presents with    Depression     X 1 [address] [facility]. 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 Have you been to the ER, urgent care clinic since your last visit? Hospitalized since your last visit? No    2. Have you seen or consulted any other health care providers outside of the [address] [place] [address] Jose Luis since your last visit? Include any pap smears or colon screening. No     There are no preventive care reminders to display for this patient. Do you have an [address] in place in the event that you have a healthcare crisis that could impact your decision making as it pertains to your health? NO    Would you like information about Advance Care Planning? NO    Information given.  NO

## 2018-03-22 DIAGNOSIS — I10 ESSENTIAL HYPERTENSION WITH GOAL BLOOD PRESSURE LESS THAN 130/85: ICD-10-CM

## 2018-03-22 RX ORDER — HYDROCHLOROTHIAZIDE 25 MG/1
25 TABLET ORAL DAILY
Qty: 90 TAB | Refills: 1 | Status: SHIPPED | OUTPATIENT
Start: 2018-03-22

## 2018-03-22 RX ORDER — AMLODIPINE AND BENAZEPRIL HYDROCHLORIDE 10; 20 MG/1; MG/1
1 CAPSULE ORAL DAILY
Qty: 90 CAP | Refills: 1 | Status: SHIPPED | OUTPATIENT
Start: 2018-03-22

## 2018-03-22 NOTE — TELEPHONE ENCOUNTER
Requested Prescriptions     Pending Prescriptions Disp Refills    amLODIPine-benazepril (LOTREL) 10-20 mg per capsule 90 Cap 1     Sig: Take 1 Cap by mouth daily.  hydroCHLOROthiazide (HYDRODIURIL) 25 mg tablet 90 Tab 1     Sig: Take 1 Tab by mouth daily.      Future Appointments:  No future appointments.    Last Appointment With Me:  3/16/2018   Last Filled:  Lotrel  06.15.2017 + 1 refill  HCTZ  07.26.2016 + 5 refills  Changes Made to Medication on Last Visit:  None

## 2018-03-22 NOTE — TELEPHONE ENCOUNTER
Pt called in ref to her two refill req for the her hydrochlorothiazide and amlodipine she req last fri 3/16/18. [facility] [place] [place] [facility] pt stated that she has completely ran out of her hctz

## 2018-03-27 ENCOUNTER — TELEPHONE (OUTPATIENT)
Dept: INTERNAL MEDICINE CLINIC | Age: 53
End: 2018-03-27

## 2018-03-27 NOTE — TELEPHONE ENCOUNTER
Pt called stated that she has been having severe joint pain, stated that it hurts her to walk, move her arms and neck stated that she would like a call back at [phone]

## 2018-03-29 ENCOUNTER — TELEPHONE (OUTPATIENT)
Dept: INTERNAL MEDICINE CLINIC | Age: 53
End: 2018-03-29

## 2018-03-29 NOTE — TELEPHONE ENCOUNTER
Pt was calling in reference to not receiving a callback about her joint pain. Please call her at [phone].

## 2018-03-30 NOTE — TELEPHONE ENCOUNTER
Patient states that she has been taking wellbutrin x 2 weeks. She also states that she has been experiencing since Tuesday (03.27.2018) symptoms of:  Nausea, Right Arm Shaking, Dizziness, and Joints Hurting.

## 2018-04-02 DIAGNOSIS — F33.9 RECURRENT DEPRESSION (HCC): Primary | ICD-10-CM

## 2018-04-02 RX ORDER — MIRTAZAPINE 30 MG/1
30 TABLET, FILM COATED ORAL
Qty: 30 TAB | Refills: 5 | Status: SHIPPED | OUTPATIENT
Start: 2018-04-02

## 2018-04-02 NOTE — TELEPHONE ENCOUNTER
Patient states has been off medication wellbutrin since Friday. States SX started by wellbutrin have been relieved. Patient is willing to try trazodone.

## 2018-04-13 DIAGNOSIS — R68.82 DECREASED LIBIDO: ICD-10-CM

## 2018-04-13 DIAGNOSIS — N95.1 HOT FLASHES DUE TO MENOPAUSE: ICD-10-CM

## 2018-04-17 RX ORDER — ESTRADIOL 2 MG/1
2 TABLET ORAL DAILY
Qty: 30 TAB | Refills: 12 | Status: SHIPPED | OUTPATIENT
Start: 2018-04-17

## 2018-04-17 NOTE — TELEPHONE ENCOUNTER
From: Gab Hair  To: Lubna Velazquez MD  Sent: 4/13/2018 1:51 AM EDT  Subject: Medication Renewal Request    Original authorizing provider: MD Shilpa Bose Emaneul would like a refill of the following medications:  estradiol (ESTRACE) 2 mg tablet Lubna Velazquez MD]  TESTOSTERONE CREAM Lubna Velazquez MD]    Preferred pharmacy: [facility]    Comment:  Hi Dr. Mao Lopez, Even though I have marked these prescriptions to refilled because my sex drive hasn't gotten better, instead it has gotten worse. The hot flashes are about the same. With the warm weather coming in they will get worse.  Can you please call me something else in?

## 2018-04-25 ENCOUNTER — TELEPHONE (OUTPATIENT)
Dept: SURGERY | Age: 53
End: 2018-04-25

## 2018-04-25 NOTE — TELEPHONE ENCOUNTER
Received call from [address]  stating requesting to alternate Testosterone 1 % cream with Testosterone gel. States that cream is not in stock.  Please advise  [phone] Pharmacy

## 2018-04-26 ENCOUNTER — TELEPHONE (OUTPATIENT)
Dept: SURGERY | Age: 53
End: 2018-04-26

## 2018-04-26 NOTE — TELEPHONE ENCOUNTER
Call placed to [facility] pharmacy #[id number] regarding clarification for Testosterone cream. Pharmacist states that Testosterone is only available in 5 gm gel /50 mg package. Please advise. whether or not dose is equivalent to original order.

## 2018-04-30 NOTE — TELEPHONE ENCOUNTER
----- Message from [address] [place] [address] St. Deni Carvalho sent at 4/27/2018  4:37 PM EDT -----  Regarding: RE: Prescription Question  Contact: [phone]  Thank you for staying on top of the prescription for me now they are saying that they need prior authorization from Dr. Kali Torres because the insurance won't pay for it.  ----- Message -----  From: Ceasar Holliday LPN  Sent: 6/02/7215  2:36 PM EDT  To: [employer] [address]  Subject: RE: Prescription Question    Good Afternoon, Your prescription for the testosterone has been clarified by Madeline Pierre. I have contacted your pharmacy [facility] in [place] with an update. The pharmacist informed me that your prescription should be ready shortly. I apologize for any inconvenience and hope you have a wonderful weekend!     ----- Message -----     From: [address] [place] [address] [facility] [facility]     Sent: 4/26/2018  4:23 PM EDT       To: Roel Rayo MD  Subject: RE: Prescription Question    Thank you very much. It is amazing that they could not tell me that. They only told me that they have not gotten anything from Dr. Kali Torres. Thank you again for checking for me.  ----- Message -----  From: Ceasar Holliday LPN  Sent: 1/18/3782  4:11 PM EDT  To: Benjie Bob  Subject: RE: Prescription Question    Good Afternoon,   Madeline Pierre is not in the office today. I've spoken with the pharmacist at [facility] and was informed that the prescription for Testosterone was received but was not filled because it's only available in gel form, not the cream as written. .The dose is different between the cream and gel ,so this will need to be clarified by Madeline Pierre. I will reach out to the pharmacy again once this is clarified by Madeline Pierre and will contact you with an update. Thank You    ----- Message -----     From: [employer] [address]     Sent: 4/26/2018  2:17 PM EDT       To: Roel Rayo MD  Subject: RE: Prescription Question    You are not going to believe this Dr. Kali Torres but I just left the pharmacy and there is no prescription or even no recorded that you even called. [facility] sure [facility] The number is [phone] and am sure that's what you called. They are laughing as though its funny but I made them aware you have made many attempts to get this filled and I got 1 prescription and this 1 was supposed to been with it.  ----- Message -----  From: Satish Beltran MD  Sent: 4/25/2018  7:12 PM EDT  To: Brooke Bob  Subject: RE: Prescription Question    Done    ----- Message -----     From: Brooke Juarez Marilia Andino     Sent: 4/24/2018  5:51 PM EDT       To: Satihs Beltran MD  Subject: RE: Prescription Question    Thanks again Dr. MARIA ESTHER Vines.  ----- Message -----  From: Satish Beltran MD  Sent: 4/24/2018  4:52 PM EDT  To: Brooke Bob  Subject: RE: Prescription Question    I just called it in on the hot line for doctors at [phone].      ----- Message -----     From: Brooke Bob     Sent: 4/23/2018  5:35 PM EDT       To: Satish Beltran MD  Subject: RE: Prescription Shagufta Reas Dr. MARIA ESTHER Vines,  I still don't have the prescription. The pharmacy is [facility] in [place], [phone]. I don't know what is going on with the system because I did get the other prescription you sent; just not the Testosterone Cream.  Sorry to keep worrying you about it. [facility]  ----- Message -----  From: Satish Beltran MD  Sent: 4/18/2018  5:20 PM EDT  To: Brooke Bob  Subject: RE: Prescription Question    This time I called the prescription for Testosterone Cream to your pharmacy. I left the info on the Doctors voicemail for the pharmacist.  [address] [place] [address]!    ----- Message -----     From: Brooke Bob     Sent: 4/18/2018  2:00 PM EDT       To: Satish Beltran MD  Subject: Prescription Question    Hi Dr. MARIA ESTHER Vines,  Thank you for refilling my prescriptions. I did get the Estradiol but the pharmacy never got the prescription for the Testosterone Cream. I had them check again today but they said they never received it.  Can you please resend the prescription? Sex has not been high on my list of things to do (I actually been avoiding it) but I know it's not normal for me to have no drive or desire for sex at all. It has been this way before the hysterectomy but it has gotten a lot worse since the hysterectomy.      Sincerely,  Ori Tucker

## 2018-04-30 NOTE — TELEPHONE ENCOUNTER
Received request for Prior Authorization for medication Testosterone. 1 % Prior authorization denied . Patient contacted and made aware.

## 2018-05-02 ENCOUNTER — TELEPHONE (OUTPATIENT)
Dept: SURGERY | Age: 53
End: 2018-05-02

## 2018-05-02 NOTE — TELEPHONE ENCOUNTER
Patient informed of [place] denial for medication Testosterone via he insurance plan. Patient requesting alternative medication to treat decreased libido that is covered under plan. Please advise.   [phone] (home)

## 2018-05-11 DIAGNOSIS — Z12.39 SCREENING FOR BREAST CANCER: ICD-10-CM
